# Patient Record
Sex: FEMALE | Race: WHITE | NOT HISPANIC OR LATINO | Employment: FULL TIME | URBAN - METROPOLITAN AREA
[De-identification: names, ages, dates, MRNs, and addresses within clinical notes are randomized per-mention and may not be internally consistent; named-entity substitution may affect disease eponyms.]

---
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
## 2018-01-22 VITALS
HEIGHT: 66 IN | DIASTOLIC BLOOD PRESSURE: 82 MMHG | SYSTOLIC BLOOD PRESSURE: 128 MMHG | WEIGHT: 233 LBS | HEART RATE: 93 BPM | BODY MASS INDEX: 37.45 KG/M2

## 2018-01-29 ENCOUNTER — OFFICE VISIT (OUTPATIENT)
Dept: OBGYN CLINIC | Facility: CLINIC | Age: 18
End: 2018-01-29
Payer: COMMERCIAL

## 2018-01-29 VITALS
SYSTOLIC BLOOD PRESSURE: 105 MMHG | DIASTOLIC BLOOD PRESSURE: 72 MMHG | HEIGHT: 66 IN | HEART RATE: 81 BPM | WEIGHT: 224 LBS | BODY MASS INDEX: 36 KG/M2

## 2018-01-29 DIAGNOSIS — Z30.41 ENCOUNTER FOR SURVEILLANCE OF CONTRACEPTIVE PILLS: ICD-10-CM

## 2018-01-29 DIAGNOSIS — Z01.419 ENCOUNTER FOR GYNECOLOGICAL EXAMINATION WITHOUT ABNORMAL FINDING: Primary | ICD-10-CM

## 2018-01-29 PROCEDURE — 99395 PREV VISIT EST AGE 18-39: CPT | Performed by: NURSE PRACTITIONER

## 2018-01-29 RX ORDER — NORGESTIMATE AND ETHINYL ESTRADIOL 0.25-0.035
1 KIT ORAL DAILY
Qty: 28 TABLET | Refills: 11 | Status: SHIPPED | OUTPATIENT
Start: 2018-01-29 | End: 2020-01-18 | Stop reason: ALTCHOICE

## 2018-01-29 NOTE — PROGRESS NOTES
Ancelmo Dietz is a 21 y o   [de-identified] female who presents with uterine fibroids  Periods are regular every 28-30 days, lasting 5 days  Dysmenorrhea:none  Cyclic symptoms include breast tenderness  No intermenstrual bleeding, spotting, or discharge  Patient is unsure if she received the Gardasil vaccine series  The patient last seen here in October of 2017 and cultures for Chlamydia and gonorrhea were both negative at that time, she is with the same partner  Patient is on Sprintec,  1 pill daily denies any problems with use  Denies any ACHES sx  Current contraception: OCP (estrogen/progesterone)  History of abnormal Pap smear:not applicable  Family history of uterine or ovarian cancer: no  Regular self breast exam: no  History of abnormal mammogram: not applicable  Family history of breast cancer: no  History of abnormal lipids: no  Menstrual History:  OB History     No data available         Menarche age: 13  Patient's last menstrual period was 01/24/2018 (exact date)  The following portions of the patient's history were reviewed and updated as appropriate:    Review of Systems  Pertinent items are noted in HPI  Objective      /72 (BP Location: Left arm, Patient Position: Sitting, Cuff Size: Adult)   Pulse 81   Ht 5' 6" (1 676 m)   Wt 102 kg (224 lb)   LMP 01/24/2018 (Exact Date)   Breastfeeding? No   BMI 36 15 kg/m²     General:   alert and oriented, in no acute distress, alert, appears stated age and cooperative   Heart: regular rate and rhythm, S1, S2 normal, no murmur, click, rub or gallop   Lungs: clear to auscultation bilaterally   Abdomen: soft, non-tender, without masses or organomegaly   Vulva: Not examined   Vagina: Not examined   Cervix: Not examined   Uterus: Not examined   Adnexa: Not examined  Breasts- no masses noted, no skin changes               Assessment   Annual GYN exam  Contraception management     Plan   Sprintec renewed  Information on Gardisil given, call if not received in the past   RTO in 1 year

## 2019-09-11 ENCOUNTER — OFFICE VISIT (OUTPATIENT)
Dept: URGENT CARE | Facility: CLINIC | Age: 19
End: 2019-09-11
Payer: COMMERCIAL

## 2019-09-11 VITALS
BODY MASS INDEX: 38.09 KG/M2 | OXYGEN SATURATION: 97 % | WEIGHT: 237 LBS | HEART RATE: 91 BPM | HEIGHT: 66 IN | RESPIRATION RATE: 16 BRPM | DIASTOLIC BLOOD PRESSURE: 62 MMHG | SYSTOLIC BLOOD PRESSURE: 112 MMHG | TEMPERATURE: 97.7 F

## 2019-09-11 DIAGNOSIS — S61.215A LACERATION OF LEFT RING FINGER WITHOUT FOREIGN BODY WITHOUT DAMAGE TO NAIL, INITIAL ENCOUNTER: Primary | ICD-10-CM

## 2019-09-11 PROCEDURE — 99213 OFFICE O/P EST LOW 20 MIN: CPT | Performed by: PHYSICIAN ASSISTANT

## 2019-09-11 PROCEDURE — 12001 RPR S/N/AX/GEN/TRNK 2.5CM/<: CPT | Performed by: PHYSICIAN ASSISTANT

## 2019-09-11 NOTE — PROGRESS NOTES
[facility]        NAME: Leydi Handler is a 19 y o  female  : 2000    MRN: [id number]  DATE: 2019  TIME: 3:56 PM    Assessment and Plan   Laceration of left ring finger without foreign body without damage to nail, initial encounter [S61 215A]  1  Laceration of left ring finger without foreign body without damage to nail, initial encounter           Patient Instructions     Patient Instructions     This was closed with Dermabond  Hemostasis achieved  Keep dry today  She had a tetanus shot 2 years ago  Monitor for signs of infection  Follow up with PCP in 3-5 days  Proceed to  ER if symptoms worsen  Chief Complaint     Chief Complaint   Patient presents with    Laceration     left hand/ring finger, cutting cheese and cut the tip of finger         History of Present Illness         68-year-old female presents with left ring finger wound today  She was cutting cheese and slice the tip of her finger  She is right-hand dominant  Last tetanus shot was 2 years ago  Review of Systems   Review of Systems   Constitutional: Negative  Respiratory: Negative  Cardiovascular: Negative  Skin: Positive for wound  Neurological: Negative for numbness  Current Medications       Current Outpatient Medications:     norgestimate-ethinyl estradiol (SPRINTEC 28) 0 25-35 MG-MCG per tablet, Take 1 tablet by mouth daily, Disp: 28 tablet, Rfl: 11    Current Allergies     Allergies as of 2019    (No Known Allergies)            The following portions of the patient's history were reviewed and updated as appropriate: allergies, current medications, past family history, past medical history, past social history, past surgical history and problem list      History reviewed  No pertinent past medical history      Past Surgical History:   Procedure Laterality Date    FRACTURE SURGERY Right     Debbie Hussein and had surgery with plates and 6 screws       Family History Problem Relation Age of Onset    Lymphoma Brother          Medications have been verified  Objective   /62 (BP Location: Right arm, Patient Position: Sitting, Cuff Size: Large)   Pulse 91   Temp 97 7 °F (36 5 °C) (Temporal)   Resp 16   Ht 5' 6" (1 676 m)   Wt 108 kg (237 lb)   SpO2 97%   BMI 38 25 kg/m²          Physical Exam     Physical Exam   Constitutional: She appears well-developed and well-nourished  No distress  Cardiovascular: Normal rate and regular rhythm  Pulmonary/Chest: Effort normal and breath sounds normal    Skin:     Left ring finger tip with a 3 mm x 6 mm skin avulsion  No flap or wound to with suture  Wound was irrigated and cleaned  This was closed with Dermabond  Patient tolerated well  Hemostasis achieved  Laceration repair  Date/Time: 9/11/2019 3:58 PM  Performed by: Vince Land PA-C  Authorized by: Vince Land PA-C   Consent: Verbal consent obtained    Consent given by: patient  Patient identity confirmed: verbally with patient  Body area: upper extremity  Location details: right ring finger  Laceration length: 6 cm  Foreign bodies: no foreign bodies  Tendon involvement: none  Nerve involvement: none  Vascular damage: no    Sedation:  Patient sedated: no        Procedure Details:  Skin closure: glue  Approximation difficulty: simple

## 2019-09-11 NOTE — PATIENT INSTRUCTIONS
This was closed with Dermabond  Hemostasis achieved  Keep dry today  She had a tetanus shot 2 years ago  Monitor for signs of infection

## 2020-01-18 ENCOUNTER — HOSPITAL ENCOUNTER (EMERGENCY)
Facility: HOSPITAL | Age: 20
Discharge: HOME | End: 2020-01-18
Attending: EMERGENCY MEDICINE | Admitting: EMERGENCY MEDICINE

## 2020-01-18 VITALS
RESPIRATION RATE: 18 BRPM | HEART RATE: 92 BPM | BODY MASS INDEX: 36.95 KG/M2 | DIASTOLIC BLOOD PRESSURE: 55 MMHG | HEIGHT: 66 IN | SYSTOLIC BLOOD PRESSURE: 124 MMHG | WEIGHT: 229.94 LBS | OXYGEN SATURATION: 100 % | TEMPERATURE: 98 F

## 2020-01-18 DIAGNOSIS — Z34.90 PREGNANCY, UNSPECIFIED GESTATIONAL AGE: Primary | ICD-10-CM

## 2020-01-18 LAB
EXT PREG TEST URINE: POSITIVE
EXT. CONTROL ED NAV: ABNORMAL

## 2020-01-18 PROCEDURE — 81025 URINE PREGNANCY TEST: CPT | Performed by: EMERGENCY MEDICINE

## 2020-01-18 PROCEDURE — 99282 EMERGENCY DEPT VISIT SF MDM: CPT | Performed by: EMERGENCY MEDICINE

## 2020-01-18 PROCEDURE — 99284 EMERGENCY DEPT VISIT MOD MDM: CPT

## 2020-01-18 NOTE — ED PROVIDER NOTES
History  Chief Complaint   Patient presents with    Possible Pregnancy     last period dec 15th, + nausea, denies abd pain or vaginal bleeding      Pt comes to ED requesting pregnancy test  Last period one month ago  No abd pain  No vaginal bleeding  No weakness, syncope or presyncope  No other sxs or concerns  None       History reviewed  No pertinent past medical history  Past Surgical History:   Procedure Laterality Date    FRACTURE SURGERY Right 2012    Alberto Corina and had surgery with plates and 6 screws       Family History   Problem Relation Age of Onset    Lymphoma Brother      I have reviewed and agree with the history as documented  Social History     Tobacco Use    Smoking status: Never Smoker    Smokeless tobacco: Never Used   Substance Use Topics    Alcohol use: No    Drug use: No        Review of Systems   Gastrointestinal: Negative for abdominal pain and vomiting  Neurological: Negative for syncope and weakness  All other systems reviewed and are negative  Physical Exam  Physical Exam   Constitutional: She is oriented to person, place, and time  She appears well-developed and well-nourished  No distress  HENT:   Head: Normocephalic and atraumatic  Eyes: Pupils are equal, round, and reactive to light  Conjunctivae and EOM are normal  Right eye exhibits no discharge  Left eye exhibits no discharge  Neck: Normal range of motion  Neck supple  No JVD present  Pulmonary/Chest: No stridor  Abdominal: Soft  She exhibits no distension  There is no tenderness  Musculoskeletal: Normal range of motion  She exhibits no edema, tenderness or deformity  Neurological: She is alert and oriented to person, place, and time  No cranial nerve deficit or sensory deficit  She exhibits normal muscle tone  Coordination normal    Skin: Skin is warm and dry  Capillary refill takes less than 2 seconds  She is not diaphoretic  Nursing note and vitals reviewed        Vital Signs  ED Triage Vitals [01/18/20 1033]   Temperature Pulse Respirations Blood Pressure SpO2   98 °F (36 7 °C) 92 18 124/55 100 %      Temp Source Heart Rate Source Patient Position - Orthostatic VS BP Location FiO2 (%)   Oral Monitor Sitting Left arm --      Pain Score       No Pain           Vitals:    01/18/20 1033   BP: 124/55   Pulse: 92   Patient Position - Orthostatic VS: Sitting         Visual Acuity      ED Medications  Medications - No data to display    Diagnostic Studies  Results Reviewed     Procedure Component Value Units Date/Time    POCT pregnancy, urine [[id number]]  (Abnormal) Resulted:  01/18/20 1116    Lab Status:  Final result Updated:  01/18/20 1116     EXT PREG TEST UR (Ref: Negative) positive     Control   No orders to display              Procedures  Procedures         ED Course                               MDM  Number of Diagnoses or Management Options  Pregnancy, unspecified gestational age:   Diagnosis management comments: Early pregnancy without s/s concerning for acute pathology eg ectopic  Plan - d/c home, f/u ob, rted if abd pain weakness syncope etc          Disposition  Final diagnoses:   Pregnancy, unspecified gestational age     Time reflects when diagnosis was documented in both MDM as applicable and the Disposition within this note     Time User Action Codes Description Comment    1/18/2020 11:28 AM Amy Raya Add [Z34 90] Pregnancy, unspecified gestational age       ED Disposition     ED Disposition Condition Date/Time Comment    Discharge Stable Sat Jan 18, 2020 11:28 AM Eleanor Gant discharge to home/self care              Follow-up Information     Follow up With Specialties Details Why Contact Info Additional [address]  Obstetrics and Gynecology   [address] [facility] [address]  [address]  [facility] Gynecology [address] [facility] [address], [address] [facility] [address], [address] [address], [place], [address] [facility] [address]    An Rashid MD Obstetrics and Gynecology, Obstetrics, Gynecology   [phone] [address] [address]  [phone]             There are no discharge medications for this patient  No discharge procedures on file      ED Provider  Electronically Signed by           Hipolito Mccormack MD  01/18/20 2367

## 2020-01-25 ENCOUNTER — APPOINTMENT (EMERGENCY)
Dept: ULTRASOUND IMAGING | Facility: HOSPITAL | Age: 20
End: 2020-01-25

## 2020-01-25 ENCOUNTER — HOSPITAL ENCOUNTER (EMERGENCY)
Facility: HOSPITAL | Age: 20
Discharge: HOME | End: 2020-01-25
Attending: EMERGENCY MEDICINE

## 2020-01-25 VITALS
WEIGHT: 229.72 LBS | SYSTOLIC BLOOD PRESSURE: 126 MMHG | OXYGEN SATURATION: 98 % | DIASTOLIC BLOOD PRESSURE: 57 MMHG | TEMPERATURE: 98.5 F | RESPIRATION RATE: 20 BRPM | HEIGHT: 66 IN | BODY MASS INDEX: 36.92 KG/M2 | HEART RATE: 96 BPM

## 2020-01-25 DIAGNOSIS — R10.9 ABDOMINAL CRAMPING: ICD-10-CM
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
## 2020-01-25 LAB
ABO GROUP BLD: NORMAL
B-HCG SERPL-ACNC: 7468 MIU/ML
EXT PREG TEST URINE: POSITIVE
EXT. CONTROL ED NAV: ABNORMAL
RH BLD: POSITIVE

## 2020-01-25 PROCEDURE — 99284 EMERGENCY DEPT VISIT MOD MDM: CPT

## 2020-01-25 PROCEDURE — 36415 COLL VENOUS BLD VENIPUNCTURE: CPT | Performed by: PHYSICIAN ASSISTANT

## 2020-01-25 PROCEDURE — 86901 BLOOD TYPING SEROLOGIC RH(D): CPT | Performed by: PHYSICIAN ASSISTANT

## 2020-01-25 PROCEDURE — 84702 CHORIONIC GONADOTROPIN TEST: CPT | Performed by: PHYSICIAN ASSISTANT

## 2020-01-25 PROCEDURE — 81025 URINE PREGNANCY TEST: CPT | Performed by: PHYSICIAN ASSISTANT

## 2020-01-25 PROCEDURE — 76815 OB US LIMITED FETUS(S): CPT

## 2020-01-25 PROCEDURE — 99284 EMERGENCY DEPT VISIT MOD MDM: CPT | Performed by: PHYSICIAN ASSISTANT

## 2020-01-25 PROCEDURE — 86900 BLOOD TYPING SEROLOGIC ABO: CPT | Performed by: PHYSICIAN ASSISTANT
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
## 2020-01-28 ENCOUNTER — APPOINTMENT (OUTPATIENT)
Dept: LAB | Facility: CLINIC | Age: 20
End: 2020-01-28

## 2020-01-28 DIAGNOSIS — R10.9 ABDOMINAL CRAMPING: ICD-10-CM

## 2020-01-28 LAB — B-HCG SERPL-ACNC: ABNORMAL MIU/ML

## 2020-01-28 PROCEDURE — 36415 COLL VENOUS BLD VENIPUNCTURE: CPT

## 2020-01-28 PROCEDURE — 84702 CHORIONIC GONADOTROPIN TEST: CPT

## 2020-01-29 ENCOUNTER — OFFICE VISIT (OUTPATIENT)
Dept: URGENT CARE | Facility: CLINIC | Age: 20
End: 2020-01-29

## 2020-01-29 VITALS
RESPIRATION RATE: 18 BRPM | HEART RATE: 90 BPM | DIASTOLIC BLOOD PRESSURE: 82 MMHG | SYSTOLIC BLOOD PRESSURE: 108 MMHG | TEMPERATURE: 97.5 F | HEIGHT: 66 IN | BODY MASS INDEX: 36.48 KG/M2 | OXYGEN SATURATION: 97 % | WEIGHT: 227 LBS

## 2020-01-29 DIAGNOSIS — R05.9 COUGH: Primary | ICD-10-CM

## 2020-01-29 PROCEDURE — G0382 LEV 3 HOSP TYPE B ED VISIT: HCPCS | Performed by: PHYSICIAN ASSISTANT

## 2020-01-29 RX ORDER — AMOXICILLIN 500 MG/1
500 TABLET, FILM COATED ORAL 3 TIMES DAILY
Qty: 21 TABLET | Refills: 0 | Status: SHIPPED | OUTPATIENT
Start: 2020-01-29 | End: 2020-02-05

## 2020-01-29 NOTE — PROGRESS NOTES
[facility]        NAME: Marvin Dahl is a 22 y o  female  : 2000    MRN: [id number]  DATE: 2020  TIME: 11:25 AM    Assessment and Plan   Cough [R05]  1  Cough  amoxicillin (AMOXIL) 500 MG tablet         Patient Instructions     Patient Instructions   Due to 10 days of symptoms and pregnancy will treat with amoxicillin  She can use over-the-counter cold meds  I gave her printed out for pregnancy  Follow up with PCP in 3-5 days  Proceed to  ER if symptoms worsen  Chief Complaint     Chief Complaint   Patient presents with    Cough     Pt has had cough that has been going on for 1 5-2 weeks         History of Present Illness       22year old female present the clinic with 10 days of cough and some congestion  No fever  She has pregnant  No chest pain shortness of breath  She was taking over the counter cold medicines  Review of Systems   Review of Systems   Constitutional: Negative for chills, fatigue and fever  HENT: Positive for congestion, sinus pressure and sore throat  Eyes: Negative  Negative for visual disturbance  Respiratory: Positive for cough  Negative for chest tightness and shortness of breath  Cardiovascular: Negative for chest pain and palpitations  Gastrointestinal: Negative  Negative for diarrhea, nausea and vomiting  Neurological: Negative for dizziness  Current Medications       Current Outpatient Medications:     amoxicillin (AMOXIL) 500 MG tablet, Take 1 tablet (500 mg total) by mouth 3 (three) times a day for 7 days, Disp: 21 tablet, Rfl: 0    Current Allergies     Allergies as of 2020    (No Known Allergies)            The following portions of the patient's history were reviewed and updated as appropriate: allergies, current medications, past family history, past medical history, past social history, past surgical history and problem list      History reviewed  No pertinent past medical history      Past Surgical History:   Procedure Laterality Date    FRACTURE SURGERY Right 2012    Tirso Santoyo and had surgery with plates and 6 screws       Family History   Problem Relation Age of Onset    Lymphoma Brother          Medications have been verified  Objective   /82   Pulse 90   Temp 97 5 °F (36 4 °C) (Temporal)   Resp 18   Ht 5' 6" (1 676 m)   Wt 103 kg (227 lb)   LMP 12/18/2019   SpO2 97%   BMI 36 64 kg/m²        Physical Exam     Physical Exam   Constitutional: She is oriented to person, place, and time  She appears well-developed and well-nourished  No distress  HENT:   Head: Normocephalic and atraumatic  Right Ear: Tympanic membrane, external ear and ear canal normal  Tympanic membrane is not erythematous, not retracted and not bulging  No middle ear effusion  Left Ear: Tympanic membrane, external ear and ear canal normal  Tympanic membrane is not erythematous, not retracted and not bulging  No middle ear effusion  Nose: Nose normal  No mucosal edema or rhinorrhea  Right sinus exhibits no maxillary sinus tenderness and no frontal sinus tenderness  Left sinus exhibits no maxillary sinus tenderness and no frontal sinus tenderness  Mouth/Throat: Oropharynx is clear and moist and mucous membranes are normal  No posterior oropharyngeal erythema  Eyes: Pupils are equal, round, and reactive to light  Conjunctivae and EOM are normal  Right eye exhibits no chemosis and no discharge  Left eye exhibits no chemosis and no discharge  Right conjunctiva is not injected  Left conjunctiva is not injected  Neck: Normal range of motion  Neck supple  Cardiovascular: Normal rate, regular rhythm and normal heart sounds  Pulmonary/Chest: Effort normal and breath sounds normal  No respiratory distress  She has no wheezes  She has no rales  Lymphadenopathy:     She has no cervical adenopathy  Right cervical: No superficial cervical adenopathy present         Left cervical: No superficial cervical adenopathy present  Neurological: She is alert and oriented to person, place, and time  No cranial nerve deficit  Skin: Skin is warm and dry  No rash noted

## 2020-01-29 NOTE — PATIENT INSTRUCTIONS
Due to 10 days of symptoms and pregnancy will treat with amoxicillin  She can use over-the-counter cold meds  I gave her printed out for pregnancy

## 2020-02-13 ENCOUNTER — ULTRASOUND (OUTPATIENT)
Dept: OBGYN CLINIC | Facility: CLINIC | Age: 20
End: 2020-02-13

## 2020-02-13 VITALS
HEIGHT: 66 IN | HEART RATE: 86 BPM | SYSTOLIC BLOOD PRESSURE: 103 MMHG | BODY MASS INDEX: 36.8 KG/M2 | DIASTOLIC BLOOD PRESSURE: 66 MMHG | WEIGHT: 229 LBS

## 2020-02-13 DIAGNOSIS — O21.9 NAUSEA AND VOMITING IN PREGNANCY: Primary | ICD-10-CM

## 2020-02-13 DIAGNOSIS — Z3A.08 8 WEEKS GESTATION OF PREGNANCY: ICD-10-CM

## 2020-02-13 PROCEDURE — 99213 OFFICE O/P EST LOW 20 MIN: CPT | Performed by: OBSTETRICS & GYNECOLOGY

## 2020-02-13 PROCEDURE — 76801 OB US < 14 WKS SINGLE FETUS: CPT | Performed by: OBSTETRICS & GYNECOLOGY

## 2020-02-13 RX ORDER — ONDANSETRON 4 MG/1
4 TABLET, ORALLY DISINTEGRATING ORAL EVERY 6 HOURS PRN
Qty: 30 TABLET | Refills: 2 | Status: SHIPPED | OUTPATIENT
Start: 2020-02-13 | End: 2020-03-17 | Stop reason: ALTCHOICE

## 2020-02-13 NOTE — PROGRESS NOTES
Nicki Roberts presents today for routine OB visit at [address]  /66 (BP Location: Left arm, Patient Position: Sitting, Cuff Size: Adult)   Pulse 86   Ht 5' 6" (1 676 m)   Wt 104 kg (229 lb)   LMP 12/18/2019   BMI 36 96 kg/m²   She reports nausea and vomiting  Denies vaginal bleeding or leaking of fluid     Scheduled for next ultrasound to be scheduled  Reviewed common discomforts of pregnancy in first trimester and warning signs  Advised to continue prenatal vitamins and return for OB intake  Pregnancy Problems (from 02/13/20 to present)     No problems associated with this episode

## 2020-02-17 ENCOUNTER — INITIAL PRENATAL (OUTPATIENT)
Dept: OBGYN CLINIC | Facility: CLINIC | Age: 20
End: 2020-02-17

## 2020-02-17 VITALS
DIASTOLIC BLOOD PRESSURE: 72 MMHG | BODY MASS INDEX: 36.8 KG/M2 | HEART RATE: 82 BPM | SYSTOLIC BLOOD PRESSURE: 112 MMHG | HEIGHT: 66 IN | WEIGHT: 229 LBS | RESPIRATION RATE: 16 BRPM

## 2020-02-17 DIAGNOSIS — Z3A.08 8 WEEKS GESTATION OF PREGNANCY: Primary | ICD-10-CM

## 2020-02-17 PROCEDURE — 99211 OFF/OP EST MAY X REQ PHY/QHP: CPT

## 2020-02-17 NOTE — LETTER
[facility] Letter    Danny Douglass  2000  [address]  [place] [address]       02/17/20          Danny Douglass is a patient and under our care in our office  Jas Garcia's Estimated Date of Delivery: 9/23/20  Any questions or concerns feel free to contact our office       Thank you,    [address] [facility] [address]  [id number] [place] [address]/Kade Ponce 15  [address] [facility]/Ming Mccord [address] [place] [address]/[address]  [phone]

## 2020-02-17 NOTE — LETTER
Work Letter    Ryan Young  2000  [place] [id number]    Dear Ryan Young,      02/17/20        Your employee is a patient at [facility]  We recommend that all pregnant women:    1  Have a well-ventilated workspace  2  Wear low-heeled shoes  3  Work no more than 40 hours per week  4  Have a 15 minute break every 2 hours and at least 30 minutes for a meal break  5  Use good body mechanics by bending at your knees to avoid back strain and lift no more than 20 pounds without assistance  Will need assistance with lifting over 20 lbs  6  Have ready access to bathrooms and water  She may continue to work until her due date unless medical complications arise  We anticipate she may return to work in 6-8 weeks after delivery       Sincerely,    [facility]
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
## 2020-03-05 ENCOUNTER — ROUTINE PRENATAL (OUTPATIENT)
Dept: OBGYN CLINIC | Facility: CLINIC | Age: 20
End: 2020-03-05

## 2020-03-05 VITALS
HEART RATE: 94 BPM | BODY MASS INDEX: 36.48 KG/M2 | SYSTOLIC BLOOD PRESSURE: 107 MMHG | WEIGHT: 226 LBS | DIASTOLIC BLOOD PRESSURE: 74 MMHG

## 2020-03-05 DIAGNOSIS — Z3A.11 11 WEEKS GESTATION OF PREGNANCY: Primary | ICD-10-CM

## 2020-03-05 DIAGNOSIS — Z11.3 ROUTINE SCREENING FOR STI (SEXUALLY TRANSMITTED INFECTION): ICD-10-CM

## 2020-03-05 PROCEDURE — 99213 OFFICE O/P EST LOW 20 MIN: CPT | Performed by: OBSTETRICS & GYNECOLOGY

## 2020-03-05 PROCEDURE — 87491 CHLMYD TRACH DNA AMP PROBE: CPT | Performed by: OBSTETRICS & GYNECOLOGY

## 2020-03-05 PROCEDURE — 87591 N.GONORRHOEAE DNA AMP PROB: CPT | Performed by: OBSTETRICS & GYNECOLOGY

## 2020-03-05 PROCEDURE — G0145 SCR C/V CYTO,THINLAYER,RESCR: HCPCS | Performed by: OBSTETRICS & GYNECOLOGY

## 2020-03-05 NOTE — PROGRESS NOTES
Assessment     22year old with a Pregnancy at 6 and 1/7 weeks      Plan    Patient still needs to go for prenatal panel, encouraged to do this, she states she will this week  GC/chlamydia collected today  Pap collected today  First trimester appointment at  center scheduled for 3/17/2020  Prenatal vitamins  Problem list reviewed and updated  Follow up in 4 weeks  Ancelmo Brooke is being seen today for her first obstetrical visit  This is not a planned pregnancy  She is at 11w1d gestation  Her obstetrical history is significant for obesity  Relationship with FOB: significant other, not living together  Patient does intend to breast feed  Pregnancy history fully reviewed  Menstrual History:  OB History        1    Para   0    Term   0       0    AB   0    Living   0       SAB   0    TAB   0    Ectopic   0    Multiple   0    Live Births   0                  Patient's last menstrual period was 2019  The following portions of the patient's history were reviewed and updated as appropriate: allergies, current medications, past family history, past medical history, past social history, past surgical history and problem list     Review of Systems  Pertinent items are noted in HPI        Objective     Wt 103 kg (226 lb)   LMP 2019   BMI 36 48 kg/m²   General appearance: alert and oriented, in no acute distress  Lungs: clear to auscultation bilaterally  Breasts: normal appearance, no masses or tenderness  Heart: regular rate and rhythm, S1, S2 normal, no murmur, click, rub or gallop  Abdomen: soft, non-tender; bowel sounds normal; no masses,  no organomegaly  Pelvic: external genitalia normal, vagina normal without discharge, uterus normal size, shape, and consistency, no cervical motion tenderness, cervix normal in appearance and no adnexal masses or tenderness      FHT: 160bpm by US

## 2020-03-06 LAB
C TRACH DNA SPEC QL NAA+PROBE: NEGATIVE
N GONORRHOEA DNA SPEC QL NAA+PROBE: NEGATIVE

## 2020-03-10 ENCOUNTER — APPOINTMENT (OUTPATIENT)
Dept: LAB | Facility: HOSPITAL | Age: 20
End: 2020-03-10

## 2020-03-10 DIAGNOSIS — Z3A.08 8 WEEKS GESTATION OF PREGNANCY: ICD-10-CM

## 2020-03-10 LAB
ABO GROUP BLD: NORMAL
BASOPHILS # BLD AUTO: 0.03 THOUSANDS/ΜL (ref 0–0.1)
BASOPHILS NFR BLD AUTO: 0 % (ref 0–1)
BILIRUB UR QL STRIP: NEGATIVE
BLD GP AB SCN SERPL QL: NEGATIVE
CLARITY UR: NORMAL
COLOR UR: YELLOW
EOSINOPHIL # BLD AUTO: 0.11 THOUSAND/ΜL (ref 0–0.61)
EOSINOPHIL NFR BLD AUTO: 2 % (ref 0–6)
ERYTHROCYTE [DISTWIDTH] IN BLOOD BY AUTOMATED COUNT: 13.2 % (ref 11.6–15.1)
GLUCOSE UR STRIP-MCNC: NEGATIVE MG/DL
HCT VFR BLD AUTO: 38.1 % (ref 34.8–46.1)
HGB BLD-MCNC: 12.5 G/DL (ref 11.5–15.4)
HGB UR QL STRIP.AUTO: NEGATIVE
IMM GRANULOCYTES # BLD AUTO: 0.02 THOUSAND/UL (ref 0–0.2)
IMM GRANULOCYTES NFR BLD AUTO: 0 % (ref 0–2)
KETONES UR STRIP-MCNC: NEGATIVE MG/DL
LAB AP GYN PRIMARY INTERPRETATION: NORMAL
LEUKOCYTE ESTERASE UR QL STRIP: NEGATIVE
LYMPHOCYTES # BLD AUTO: 1.85 THOUSANDS/ΜL (ref 0.6–4.47)
LYMPHOCYTES NFR BLD AUTO: 28 % (ref 14–44)
Lab: NORMAL
MCH RBC QN AUTO: 28.3 PG (ref 26.8–34.3)
MCHC RBC AUTO-ENTMCNC: 32.8 G/DL (ref 31.4–37.4)
MCV RBC AUTO: 86 FL (ref 82–98)
MONOCYTES # BLD AUTO: 0.5 THOUSAND/ΜL (ref 0.17–1.22)
MONOCYTES NFR BLD AUTO: 8 % (ref 4–12)
NEUTROPHILS # BLD AUTO: 4.19 THOUSANDS/ΜL (ref 1.85–7.62)
NEUTS SEG NFR BLD AUTO: 62 % (ref 43–75)
NITRITE UR QL STRIP: NEGATIVE
NRBC BLD AUTO-RTO: 0 /100 WBCS
PH UR STRIP.AUTO: 7 [PH]
PLATELET # BLD AUTO: 359 THOUSANDS/UL (ref 149–390)
PMV BLD AUTO: 10.1 FL (ref 8.9–12.7)
PROT UR STRIP-MCNC: NEGATIVE MG/DL
RBC # BLD AUTO: 4.42 MILLION/UL (ref 3.81–5.12)
RH BLD: POSITIVE
RUBV IGG SERPL IA-ACNC: 64.5 IU/ML
SP GR UR STRIP.AUTO: 1.01 (ref 1–1.03)
SPECIMEN EXPIRATION DATE: NORMAL
UROBILINOGEN UR QL STRIP.AUTO: 0.2 E.U./DL
WBC # BLD AUTO: 6.7 THOUSAND/UL (ref 4.31–10.16)

## 2020-03-10 PROCEDURE — 81003 URINALYSIS AUTO W/O SCOPE: CPT

## 2020-03-10 PROCEDURE — 36415 COLL VENOUS BLD VENIPUNCTURE: CPT

## 2020-03-10 PROCEDURE — 87086 URINE CULTURE/COLONY COUNT: CPT

## 2020-03-10 PROCEDURE — 80081 OBSTETRIC PANEL INC HIV TSTG: CPT

## 2020-03-11 LAB
BACTERIA UR CULT: NORMAL
HBV SURFACE AG SER QL: NORMAL
HIV 1+2 AB+HIV1 P24 AG SERPL QL IA: NORMAL
RPR SER QL: NORMAL

## 2020-03-12 ENCOUNTER — TELEPHONE (OUTPATIENT)
Dept: OBGYN CLINIC | Facility: CLINIC | Age: 20
End: 2020-03-12

## 2020-03-16 ENCOUNTER — TELEPHONE (OUTPATIENT)
Dept: PERINATAL CARE | Facility: CLINIC | Age: 20
End: 2020-03-16

## 2020-03-17 ENCOUNTER — ROUTINE PRENATAL (OUTPATIENT)
Dept: PERINATAL CARE | Facility: OTHER | Age: 20
End: 2020-03-17

## 2020-03-17 VITALS
HEART RATE: 94 BPM | SYSTOLIC BLOOD PRESSURE: 102 MMHG | HEIGHT: 66 IN | BODY MASS INDEX: 36.93 KG/M2 | DIASTOLIC BLOOD PRESSURE: 71 MMHG | WEIGHT: 229.8 LBS

## 2020-03-17 DIAGNOSIS — Z36.82 NUCHAL TRANSLUCENCY OF FETUS ON PRENATAL ULTRASOUND: ICD-10-CM

## 2020-03-17 DIAGNOSIS — Z3A.12 12 WEEKS GESTATION OF PREGNANCY: ICD-10-CM

## 2020-03-17 DIAGNOSIS — O99.210 OBESITY IN PREGNANCY, ANTEPARTUM: Primary | ICD-10-CM

## 2020-03-17 PROCEDURE — 76801 OB US < 14 WKS SINGLE FETUS: CPT | Performed by: OBSTETRICS & GYNECOLOGY

## 2020-03-17 PROCEDURE — 99241 PR OFFICE CONSULTATION NEW/ESTAB PATIENT 15 MIN: CPT | Performed by: OBSTETRICS & GYNECOLOGY

## 2020-03-17 NOTE — LETTER
March 17, 2020     Adilene Albertina, [address] [id number]    Patient: Danny Douglass   YOB: 2000   Date of Visit: 3/17/2020       Dear Dr Flaco Feliz: Thank you for referring Danny Douglass to me for evaluation  Below are my notes for this consultation  If you have questions, please do not hesitate to call me  I look forward to following your patient along with you  Sincerely,        Hermelinda Costa MD        CC: No Recipients  Hermelinda Costa MD  3/17/2020  7:49 PM  Sign at close encounter  Please see her ultrasound report in a separate report    Hermelinda Costa MD

## 2020-03-22 DIAGNOSIS — O99.210 OBESITY IN PREGNANCY, ANTEPARTUM: Primary | ICD-10-CM

## 2020-03-22 DIAGNOSIS — Z3A.12 12 WEEKS GESTATION OF PREGNANCY: ICD-10-CM

## 2020-04-01 PROBLEM — Z3A.15 15 WEEKS GESTATION OF PREGNANCY: Status: ACTIVE | Noted: 2020-03-05

## 2020-04-02 ENCOUNTER — ROUTINE PRENATAL (OUTPATIENT)
Dept: OBGYN CLINIC | Facility: CLINIC | Age: 20
End: 2020-04-02

## 2020-04-02 VITALS
WEIGHT: 230 LBS | SYSTOLIC BLOOD PRESSURE: 116 MMHG | BODY MASS INDEX: 37.12 KG/M2 | DIASTOLIC BLOOD PRESSURE: 71 MMHG | HEART RATE: 83 BPM

## 2020-04-02 DIAGNOSIS — R11.2 NAUSEA AND VOMITING, INTRACTABILITY OF VOMITING NOT SPECIFIED, UNSPECIFIED VOMITING TYPE: ICD-10-CM

## 2020-04-02 DIAGNOSIS — O99.210 OBESITY IN PREGNANCY, ANTEPARTUM: ICD-10-CM

## 2020-04-02 DIAGNOSIS — Z3A.15 15 WEEKS GESTATION OF PREGNANCY: Primary | ICD-10-CM

## 2020-04-02 PROCEDURE — 99213 OFFICE O/P EST LOW 20 MIN: CPT | Performed by: OBSTETRICS & GYNECOLOGY

## 2020-04-02 RX ORDER — ASPIRIN 81 MG/1
162 TABLET, CHEWABLE ORAL DAILY
Qty: 90 TABLET | Refills: 3 | Status: SHIPPED | OUTPATIENT
Start: 2020-04-02 | End: 2021-03-02 | Stop reason: ALTCHOICE

## 2020-04-02 RX ORDER — PYRIDOXINE HCL (VITAMIN B6) 50 MG
25 TABLET ORAL 3 TIMES DAILY
Qty: 90 EACH | Refills: 3 | Status: SHIPPED | OUTPATIENT
Start: 2020-04-02 | End: 2020-09-03

## 2020-04-23 ENCOUNTER — TELEPHONE (OUTPATIENT)
Dept: PERINATAL CARE | Facility: OTHER | Age: 20
End: 2020-04-23

## 2020-04-29 PROBLEM — Z30.09 CONTRACEPTIVE EDUCATION: Status: ACTIVE | Noted: 2020-04-29

## 2020-04-29 PROBLEM — O21.9 NAUSEA AND VOMITING IN PREGNANCY: Status: ACTIVE | Noted: 2020-04-29

## 2020-04-29 PROBLEM — Z3A.19 19 WEEKS GESTATION OF PREGNANCY: Status: ACTIVE | Noted: 2020-03-05

## 2020-04-30 ENCOUNTER — TELEMEDICINE (OUTPATIENT)
Dept: OBGYN CLINIC | Facility: CLINIC | Age: 20
End: 2020-04-30

## 2020-04-30 DIAGNOSIS — O99.210 OBESITY IN PREGNANCY, ANTEPARTUM: ICD-10-CM

## 2020-04-30 DIAGNOSIS — O21.9 NAUSEA AND VOMITING IN PREGNANCY: ICD-10-CM

## 2020-04-30 DIAGNOSIS — Z30.09 CONTRACEPTIVE EDUCATION: ICD-10-CM

## 2020-04-30 DIAGNOSIS — Z3A.19 19 WEEKS GESTATION OF PREGNANCY: Primary | ICD-10-CM

## 2020-04-30 PROCEDURE — G2012 BRIEF CHECK IN BY MD/QHP: HCPCS | Performed by: OBSTETRICS & GYNECOLOGY

## 2020-04-30 RX ORDER — ADHESIVE BANDAGE 3/4"
BANDAGE TOPICAL ONCE
Qty: 1 EACH | Refills: 0 | Status: SHIPPED | OUTPATIENT
Start: 2020-04-30 | End: 2020-04-30

## 2020-05-21 ENCOUNTER — TELEPHONE (OUTPATIENT)
Dept: OBGYN CLINIC | Facility: CLINIC | Age: 20
End: 2020-05-21

## 2020-05-21 ENCOUNTER — TELEPHONE (OUTPATIENT)
Dept: PERINATAL CARE | Facility: CLINIC | Age: 20
End: 2020-05-21

## 2020-05-21 PROBLEM — Z13.79 GENETIC SCREENING: Status: ACTIVE | Noted: 2020-05-21

## 2020-05-21 PROBLEM — Z3A.22 22 WEEKS GESTATION OF PREGNANCY: Status: ACTIVE | Noted: 2020-03-05

## 2020-05-21 PROBLEM — Z36.9 ENCOUNTER FOR FETAL ULTRASOUND: Status: ACTIVE | Noted: 2020-05-21

## 2020-05-22 ENCOUNTER — ROUTINE PRENATAL (OUTPATIENT)
Dept: PERINATAL CARE | Facility: OTHER | Age: 20
End: 2020-05-22

## 2020-05-22 ENCOUNTER — ROUTINE PRENATAL (OUTPATIENT)
Dept: OBGYN CLINIC | Facility: CLINIC | Age: 20
End: 2020-05-22

## 2020-05-22 VITALS
HEIGHT: 66 IN | DIASTOLIC BLOOD PRESSURE: 74 MMHG | HEART RATE: 102 BPM | TEMPERATURE: 96.5 F | BODY MASS INDEX: 38.18 KG/M2 | WEIGHT: 237.6 LBS | SYSTOLIC BLOOD PRESSURE: 133 MMHG

## 2020-05-22 VITALS
WEIGHT: 236.8 LBS | HEART RATE: 97 BPM | SYSTOLIC BLOOD PRESSURE: 104 MMHG | BODY MASS INDEX: 38.22 KG/M2 | DIASTOLIC BLOOD PRESSURE: 70 MMHG

## 2020-05-22 DIAGNOSIS — O99.210 OBESITY IN PREGNANCY, ANTEPARTUM: ICD-10-CM

## 2020-05-22 DIAGNOSIS — Z3A.22 22 WEEKS GESTATION OF PREGNANCY: Primary | ICD-10-CM

## 2020-05-22 DIAGNOSIS — Z36.9 ENCOUNTER FOR FETAL ULTRASOUND: ICD-10-CM

## 2020-05-22 PROCEDURE — 99213 OFFICE O/P EST LOW 20 MIN: CPT | Performed by: OBSTETRICS & GYNECOLOGY

## 2020-05-22 PROCEDURE — 76816 OB US FOLLOW-UP PER FETUS: CPT | Performed by: OBSTETRICS & GYNECOLOGY

## 2020-06-19 ENCOUNTER — ROUTINE PRENATAL (OUTPATIENT)
Dept: OBGYN CLINIC | Facility: CLINIC | Age: 20
End: 2020-06-19

## 2020-06-19 VITALS
DIASTOLIC BLOOD PRESSURE: 65 MMHG | HEART RATE: 94 BPM | BODY MASS INDEX: 38.58 KG/M2 | WEIGHT: 239 LBS | SYSTOLIC BLOOD PRESSURE: 109 MMHG

## 2020-06-19 DIAGNOSIS — O99.210 OBESITY IN PREGNANCY, ANTEPARTUM: ICD-10-CM

## 2020-06-19 DIAGNOSIS — Z3A.26 26 WEEKS GESTATION OF PREGNANCY: Primary | ICD-10-CM

## 2020-06-19 PROCEDURE — 99213 OFFICE O/P EST LOW 20 MIN: CPT | Performed by: NURSE PRACTITIONER

## 2020-07-01 ENCOUNTER — TELEPHONE (OUTPATIENT)
Dept: OBGYN CLINIC | Facility: CLINIC | Age: 20
End: 2020-07-01

## 2020-07-16 NOTE — PROGRESS NOTES
Ana Winter presents today for routine OB visit at 30w1d  Blood Pressure: 116/67  Ul=976 kg (235 lb 3 2 oz); Body mass index is 37 96 kg/m² ; TWG-Has gained 6 lb total weight gain or pregnancy,  She has lost 4 lb since her last visit she reports she has been very active but continues to eat healthy  =Fetal Heart Rate: 150; Fundal Height (cm): 31 cm  Urine dip: trace protein  Abdomen: gravid, soft, non-tender  She reports  She will get her 28 week labs done this coming week,  Awaiting on insurance  Denies uterine contractions  Denies vaginal bleeding or leaking of fluid  Reports adequate fetal movement of at least 10 movements in 2 hours once daily  Third trimester bloodwork  She is to complete her 28 week labs,  A + blood type   she did not complete quad screen  she is still taking vitamin B6 for nausea in the morning  Vaccinations:  Has declined Tdap- awaiting on insurance  obesity- 11-20 lb total weight gain in pregnancy recommended,  Last appointment total weight gain was at 10 lb, now 6 lbs   her last ultrasound was 05/22/2020,  Is to get a follow up ultrasound for growth and missed anatomy  Scheduled for next ultrasound 7/31/2020  Reviewed premature labor precautions and fetal kick counts  Advised to continue medications and return in 2 weeks  denies any COVID symptoms or exposures,  Review social distancing, hand washing, and masking         Current Outpatient Medications:     aspirin 81 mg chewable tablet, Chew 2 tablets (162 mg total) daily, Disp: 90 tablet, Rfl: 3    Prenatal Vit-Iron Carbonyl-FA (PRENATAL MULTIVITAMIN) TABS, Take 1 tablet by mouth daily, Disp: , Rfl:     Pyridoxine HCl (VITAMIN B-6) 50 MG TABS, Take 0 5 tablets (25 mg total) by mouth 3 (three) times a day, Disp: 90 each, Rfl: 3    vitamin B-12 (CYANOCOBALAMIN) 50 MCG tablet, Take 50 mcg by mouth daily, Disp: , Rfl:     doxylamine (UNISON) 25 MG tablet, Take 0 5 tablets (12 5 mg total) by mouth daily at bedtime as needed for sleep (Patient not taking: Reported on 4/30/2020), Disp: 30 tablet, Rfl: 3      Pregnancy Problems (from 02/13/20 to present)     Problem Noted Resolved    Fetal ultrasound 5/21/2020 by Efren Salcido MD No    Overview Signed 5/21/2020 10:07 PM by Efren Salcido MD     3/17/2020 (13w0d) - WNL           Genetic screening 5/21/2020 by Efren Salcido MD No    Overview Addendum 5/22/2020 10:05 AM by Efren Salcido MD      Quad screen ordered - Not done by patient - out of window at this point          Nausea and vomiting in pregnancy 4/29/2020 by Poly Blanco MD No    Overview Addendum 5/22/2020 10:06 AM by Efren Salcido MD     - Using B6 - Improved 5/22/2020         Contraceptive education 4/29/2020 by Poly Blanco MD No    Overview Addendum 5/22/2020 10:05 AM by Efren Salcido MD     - Address at follow up          Obesity in pregnancy, antepartum 3/17/2020 by Felix Hughes MD No    Overview Addendum 5/21/2020 10:09 PM by Efren Salcido MD     Encouraged healthy diet and exercise in pregnancy  Recommended weight gain 11-20lb  Early DM screen ordered -  Pending  Risk of PEC - LDASA initiated          26 weeks gestation of pregnancy 3/5/2020 by Tena Cheadle, MD No    Overview Addendum 5/21/2020 10:06 PM by Efren Salcido MD     Prenatal panel complete   Blood type: A positive, H&H 12 5/38 1  Pap smear NILM; GC/CT negative  Influenza vaccine offered, patient declines for now due to insurance coverage

## 2020-07-17 ENCOUNTER — ROUTINE PRENATAL (OUTPATIENT)
Dept: OBGYN CLINIC | Facility: CLINIC | Age: 20
End: 2020-07-17

## 2020-07-17 VITALS
SYSTOLIC BLOOD PRESSURE: 116 MMHG | TEMPERATURE: 97.8 F | DIASTOLIC BLOOD PRESSURE: 67 MMHG | HEART RATE: 101 BPM | BODY MASS INDEX: 37.8 KG/M2 | WEIGHT: 235.2 LBS | HEIGHT: 66 IN

## 2020-07-17 DIAGNOSIS — Z3A.30 30 WEEKS GESTATION OF PREGNANCY: ICD-10-CM

## 2020-07-17 DIAGNOSIS — O99.210 OBESITY IN PREGNANCY, ANTEPARTUM: Primary | ICD-10-CM

## 2020-07-17 LAB
SL AMB  POCT GLUCOSE, UA: NEGATIVE
SL AMB POCT KETONES,UA: NEGATIVE
SL AMB POCT URINE PROTEIN: ABNORMAL

## 2020-07-17 PROCEDURE — 81002 URINALYSIS NONAUTO W/O SCOPE: CPT | Performed by: NURSE PRACTITIONER

## 2020-07-17 PROCEDURE — 99213 OFFICE O/P EST LOW 20 MIN: CPT | Performed by: NURSE PRACTITIONER

## 2020-07-17 RX ORDER — MULTIVITAMIN WITH IRON
50 TABLET ORAL DAILY
COMMUNITY
End: 2020-09-03

## 2020-07-17 NOTE — PATIENT INSTRUCTIONS
Coronavirus (IEGHV-64) pregnancy FAQs: Medical experts answer your questions  From [employer] cy  [id number]  bc (courtesy of [facility])  As of 3/18/20  By Anjana Cleveland 37  Medically reviewed by Society for Maternal-Fetal Medicine       We asked parents-to-be to send us their most pressing questions about coronavirus (COVID-19)  Among them: Is it still safe to deliver in a hospital? What if my ob-gyn has the virus? We sent those questions to the top docs at the Society for Maternal-Fetal Medicine  Here are their answers  The coronavirus (COVID-19) pandemic has been declared a national emergency in the [facility] by [employer]  Moms-to-be like you are concerned about everything from getting medicines to managing disruptions at work  But above and beyond any worry about lifestyle changes is a focus on your health and the impact of COVID-19 on your pregnancy  We asked obstetrics doctors who handle the most complicated pregnancy issues to answer your questions about the coronavirus  Here are their responses, provided by Dr Reese Colón and her colleagues at the Society for [place] [employer]  Am I at more risk for COVID-19 if I'm pregnant? We don't know  Pregnancy does change your immune system in ways that might make you more susceptible to viral respiratory infections like COVID-19  If you become infected, you might also be at higher risk for more severe illness compared to the general population  Although this does not appear to be the case with COVID-19, based on limited data so far, a higher risk has been true for pregnant women with other coronavirus infections (SARS-CoV and MERS-CoV) and other viral respiratory infections, such as flu  It's important to take preventive actions to avoid infection, such as washing your hands often and avoiding people who are sick      How might coronavirus affect my pregnancy? Again, we don't know  Women with other coronavirus infections (such as SARS-CoV) did not have miscarriage or stillbirth at higher rates than the general population  We know that having other respiratory viral infections during pregnancy, such as flu, has been associated with problems like low birth weight and  birth  Also, having a high fever early in pregnancy may increase the risk of certain birth defects  Could I transmit coronavirus to my baby during pregnancy or delivery? We don't know whether you could transmit COVID-19 to your baby before or during delivery  However, among the few case studies of infants born to mothers with COVID-23 published in peer-reviewed literature, none of the infants tested positive for the virus  Also, there was no virus detected in samples of amniotic fluid or breast milk  There have been a few reports of newborns as young as a few days old with infection, suggesting that a mother can transmit the infection to her infant through close contact after delivery  There have been no reports of mother-to-baby transmission for other coronaviruses (MERS-CoV and SARS-CoV), although only limited information is available  Is it safe for me to deliver at a hospital where there have been COVID-19 cases? Yes  We know that COVID-19 is a very scary virus  The good news is that hospitals are taking great precautions to keep patients and healthcare providers safe  When a patient is even suspected to have COVID-19, they are placed in a negative pressure room  (Think of these rooms as vacuums that suck and filter the air so it's safe for the other people in the hospital)  This makes it possible for you to deliver at the hospital without putting you or your baby at risk  Hospitals are also implementing stricter visiting policies to keep patients safe  It's worth calling your hospital to check if there are any new regulations to be aware of      What plans should I make now in case the hospital system is overwhelmed when it's time for me to deliver? This is a great question to talk with your doctor or midwife about when you're 34 to 36 weeks pregnant  Every hospital is making different plans for dealing with this scenario  I work in healthcare  Should I ask my doctor to excuse me from work until the baby is born? What if I work in a school, the travel [employer], or some other high-risk setting? Healthcare facilities should take care to limit the exposure of pregnant employees to patients with confirmed or suspected COVID-19, just as they would with other infectious cases  If you continue working, be sure to follow the CDC's risk assessment and infection control guidelines  If you work in a school, travel [employer], or other high-risk setting, talk with your employer about what it's doing to protect employees and minimize infection risks  Wash your hands often  What if my OB gets COVID-19? If your doctor or midwife tests positive for COVID-19, they will need to be quarantined until they recover and are no longer at risk of transmitting the virus  In this case, you'll be assigned to another OB in your doctor's practice (or you may choose another practitioner yourself)  Ask your new OB or your doctor's office if you should self-quarantine or be tested for the virus  (It will depend on when you last saw your provider and when that person tested positive )    Should we hold off on trying to conceive because of COVID-19? At this time, there's no reason to hold off on trying to get pregnant, but the data we have is really limited  For example, we don't think the virus causes birth defects or increases your risk of miscarriage  But we don't know whether you could transmit COVID-19 to your baby before or during delivery  We also don't know if the virus lives in semen or can be sexually transmitted      We have a babymoon scheduled in the next few months - should we cancel? If you're planning to travel internationally or on a cruise, you should strongly consider canceling  At this time, the virus has reached more than 140 countries, and there are travel bans to [place], most of [place], and [place]  Places where large numbers of people gather are at highest risk, especially airports and cruise ships  If you're planning travel in the [place] , note that any travel setting increases your risk of exposure, and there may be travel bans even in [place] by the time you're scheduled to go  Even if you're state is not blocked, you'll definitely want to avoid traveling to communities where the virus is spreading  To find out where these places are, check The [employer] [place] Times map based on CDC data  For the most current advice to help you avoid exposure, check the CDC's COVID-19 travel page  Will the hospital separate me from my  and keep the baby in quarantine? If you test positive for COVID-19 or have been exposed but have no symptoms, the CDC, [employer] of Obstetricians and Gynecologists, and the Society for [place] [employer] all recommend that you be  from your baby to decrease the risk of transmission to the baby  This would last until you are no longer at risk of transmitting the virus  If you do not have COVID-19 and have not been exposed to the virus, the hospital will not separate you from your   My hospital is restricting visitors and only allowing one support person  If my support person leaves after the delivery, will they be allowed to come back? Every hospital has different policies  Contact your hospital or labor and delivery unit a week or so before delivery to get the most up-to-date restrictions  In general, if your support person needs to leave, they would be allowed back unless they knew they were exposed to COVID-19 after leaving your company    [facility] understands that the coronavirus (COVID-19) pandemic is an evolving story and that your questions will change over time  We'll continue asking moms and dads in our Community what they want to know, and we'll get the answers from experts to keep them - and you - informed and supported  My mom was planning to fly here to help me care for my new baby after delivery  Should I tell her not to come? Yes  If your mom is over 61 or has any serious chronic medical conditions (such as heart disease, lung disease, or diabetes), she is at higher risk of serious illness from COVID-19 and should avoid air travel  And remember that any travel setting increases a person's risk of exposure  So, it may be risky to have her around the baby after she has been traveling  For the most current advice on traveling, check the [facility]'s COVID-19 travel page  [facility] understands that the coronavirus pandemic is an evolving story and that your questions will change over time  We'll continue asking moms and dads in our Community what they want to know, and we'll get the answers from experts to keep them - and you - informed and supported  Pregnancy at 31 to 34 Weeks   AMBULATORY CARE:   What changes are happening to your body: You may continue to have symptoms such as shortness of breath, heartburn, contractions, or swelling of your ankles and feet  You may be gaining about 1 pound a week now  Seek care immediately if:   · You develop a severe headache that does not go away  · You have new or increased vision changes, such as blurred or spotted vision  · You have new or increased swelling in your face or hands  · You have vaginal spotting or bleeding  · Your water broke or you feel warm water gushing or trickling from your vagina  Contact your healthcare provider if:   · You have more than 5 contractions in 1 hour  · You notice any changes in your baby's movements  · You have abdominal cramps, pressure, or tightening  · You have a change in vaginal discharge      · You have chills or a fever     · You have vaginal itching, burning, or pain  · You have yellow, green, white, or foul-smelling vaginal discharge  · You have pain or burning when you urinate, less urine than usual, or pink or bloody urine  · You have questions or concerns about your condition or care  How to care for yourself at this stage of your pregnancy:   · Eat a variety of healthy foods  Healthy foods include fruits, vegetables, whole-grain breads, low-fat dairy foods, beans, lean meats, and fish  Drink liquids as directed  Ask how much liquid to drink each day and which liquids are best for you  Limit caffeine to less than 200 milligrams each day  Limit your intake of fish to 2 servings each week  Choose fish low in mercury such as canned light tuna, shrimp, salmon, cod, or tilapia  Do not  eat fish high in mercury such as swordfish, tilefish, ros mackerel, and shark  · Manage heartburn  by eating 4 or 5 small meals each day instead of large meals  Avoid spicy food  · Manage swelling  by lying down and putting your feet up  · Take prenatal vitamins as directed  Your need for certain vitamins and minerals, such as folic acid, increases during pregnancy  Prenatal vitamins provide some of the extra vitamins and minerals you need  Prenatal vitamins may also help to decrease the risk of certain birth defects  · Talk to your healthcare provider about exercise  Moderate exercise can help you stay fit  Your healthcare provider will help you plan an exercise program that is safe for you during pregnancy  · Do not smoke  If you smoke, it is never too late to quit  Smoking increases your risk of a miscarriage and other health problems during your pregnancy  Smoking can cause your baby to be born too early or weigh less at birth  Ask your healthcare provider for information if you need help quitting  · Do not drink alcohol  Alcohol passes from your body to your baby through the placenta   It can affect your baby's brain development and cause fetal alcohol syndrome (FAS)  FAS is a group of conditions that causes mental, behavior, and growth problems  · Talk to your healthcare provider before you take any medicines  Many medicines may harm your baby if you take them when you are pregnant  Do not take any medicines, vitamins, herbs, or supplements without first talking to your healthcare provider  Never use illegal or street drugs (such as marijuana or cocaine) while you are pregnant  Safety tips during pregnancy:   · Avoid hot tubs and saunas  Do not use a hot tub or sauna while you are pregnant, especially during your first trimester  Hot tubs and saunas may raise your baby's temperature and increase the risk of birth defects  · Avoid toxoplasmosis  This is an infection caused by eating raw meat or being around infected cat feces  It can cause birth defects, miscarriages, and other problems  Wash your hands after you touch raw meat  Make sure any meat is well-cooked before you eat it  Avoid raw eggs and unpasteurized milk  Use gloves or ask someone else to clean your cat's litter box while you are pregnant  Changes that are happening with your baby:  By 34 weeks, your baby may weigh more than 5 pounds  Your baby will be about 12 ½ inches long from the top of the head to the rump (baby's bottom)  Your baby is gaining about ½ pound a week  Your baby's eyes open and close now  Your baby's kicks and movements are more forceful at this time  What you need to know about prenatal care: Your healthcare provider will check your blood pressure and weight  You may also need the following:  · A urine test  may also be done to check for sugar and protein  These can be signs of gestational diabetes or infection  Protein in your urine may also be a sign of preeclampsia  Preeclampsia is a condition that can develop during week 20 or later of your pregnancy   It causes high blood pressure, and it can cause problems with your kidneys and other organs  · A Tdap vaccine  may be recommended by your healthcare provider  · Fundal height  is a measurement of your uterus to check your baby's growth  This number is usually the same as the number of weeks that you have been pregnant  Your healthcare provider may also check your baby's position  · Your baby's heart rate  will be checked  © 2017 [address] [employer] Information is for End User's use only and may not be sold, redistributed or otherwise used for commercial purposes  All illustrations and images included in CareNotes® are the copyrighted property of A D A M [employer] Inc  or Robin Ramirez  The above information is an  only  It is not intended as medical advice for individual conditions or treatments  Talk to your doctor, nurse or pharmacist before following any medical regimen to see if it is safe and effective for you  Covid - 19 instructions    If you are having any of the following     Cough   Shortness of breath   Fever  If traveled internationally or to high risk [place] states  Or been in contact with someone that has     Please call:    [phone]  option [phone]    They will screen you and direct you to the nearest testing location     Should not come to the PCP or OB office without calling that number first        Fetal Movement   WHAT YOU NEED TO KNOW:   Fetal movements are the kicks, rolls, and hiccups of your unborn baby  You may start to feel these movements when you are 20 weeks pregnant  The movements grow stronger and more frequent as your baby grows  Fetal movements show that your unborn baby is getting the oxygen and nutrients he needs before birth  Fewer fetal movements may signal a problem with your baby's health  DISCHARGE INSTRUCTIONS:   Follow up with your healthcare provider or obstetrician as directed:  Write down your questions so you remember to ask them during your visits     Normal fetal movement:  Fetal activity can be described by 4 states, from least to most active  During quiet sleep, your unborn baby may be still for up to 2 hours  During active sleep, he kicks, rolls, and moves often  During the quiet awake state, he may only move his eyes  The active awake state includes strong kicks and rolls  What affects fetal movement:  You may feel your baby move more after you eat, or after you drink caffeine  You may feel your baby move less while you are more active, such as when you exercise  You may also feel fewer movements if you are obese  Certain medicines can change your baby's movements  Tell your healthcare provider about the medicines you are taking  Track fetal movements at home:  Fetal movement is most often felt when you lie quietly on your side  Your healthcare provider may ask you to count movements for 2 hours  He may ask you to track how long it takes for your baby to move 10 times  Keep a log of your baby's movements  Contact your healthcare provider or obstetrician if:   · It takes longer than usual to feel 10 of your unborn baby's movements  · You do not feel your unborn baby move at least 10 times in 2 hours  · The skin on your hands, feet, and around your eyes is more swollen than usual      · You have a headache for at least 24 hours  · Tiny red dots appear on your skin  · Your belly is tender when you press on it  · You have questions or concerns about your condition or care  Return to the emergency department if:   · You do not feel your unborn baby move for 12 hours  · You feel cramping or constant pain in your abdomen  · You have heavy bleeding from your vagina  · You have a severe headache and cannot see clearly  · You are having trouble breathing or are vomiting  · You have a seizure  © 2017 [employer] Information is for End User's use only and may not be sold, redistributed or otherwise used for commercial purposes   All illustrations and images included in CareNotes® are the copyrighted property of A D A M , Inc  or Robin Ramirez  The above information is an  only  It is not intended as medical advice for individual conditions or treatments  Talk to your doctor, nurse or pharmacist before following any medical regimen to see if it is safe and effective for you

## 2020-07-30 ENCOUNTER — TELEPHONE (OUTPATIENT)
Dept: OBGYN CLINIC | Facility: CLINIC | Age: 20
End: 2020-07-30

## 2020-07-30 ENCOUNTER — TELEPHONE (OUTPATIENT)
Dept: PERINATAL CARE | Facility: OTHER | Age: 20
End: 2020-07-30

## 2020-07-30 NOTE — PROGRESS NOTES
Media Pod presents today for routine OB visit at [address] [place] [address]  Blood Pressure: 101/95Rl=326 kg (234 lb); Body mass index is 37 77 kg/m²  TWG=Not found  fetal heart tones 150's,  Fundal height measured 32 cm  Urine dip: trace protein   Abdomen: gravid, soft, non-tender  She reports  Is getting insurance, will complete 28 week labs Denies uterine contractions  Denies vaginal bleeding or leaking of fluid  Reports adequate fetal movement of at least 10 movements in 2 hours once daily  Third trimester bloodwork  A +blood type  Needs to complete her 28 week labs  Vaccinations: Tdap today  Plans Breast feed- needs  to bring in [employer] pump paper  Nexplanon-  Information given for patient to reviewed, follow-up at next appointment   obesity-LDASA  Scheduled for next ultrasound  Today 07/31/2020  Reviewed premature labor precautions and fetal kick counts  Advised to continue medications and return in 2 weeks        Current Outpatient Medications:     aspirin 81 mg chewable tablet, Chew 2 tablets (162 mg total) daily, Disp: 90 tablet, Rfl: 3    Prenatal Vit-Iron Carbonyl-FA (PRENATAL MULTIVITAMIN) TABS, Take 1 tablet by mouth daily, Disp: , Rfl:     Pyridoxine HCl (VITAMIN B-6) 50 MG TABS, Take 0 5 tablets (25 mg total) by mouth 3 (three) times a day, Disp: 90 each, Rfl: 3    vitamin B-12 (CYANOCOBALAMIN) 50 MCG tablet, Take 50 mcg by mouth daily, Disp: , Rfl:       Pregnancy Problems (from 02/13/20 to present)     Problem Noted Resolved    Fetal ultrasound 5/21/2020 by Suleiman Nix MD No    Overview Signed 5/21/2020 10:07 PM by Suleiman Nix MD     3/17/2020 (13w0d) - WNL           Genetic screening 5/21/2020 by Suleiman Nix MD No    Overview Addendum 5/22/2020 10:05 AM by Suleiman Nix MD      Quad screen ordered - Not done by patient - out of window at this point          Nausea and vomiting in pregnancy 4/29/2020 by Juan Oseguera MD No    Overview Addendum 5/22/2020 10:06 AM by Nacho Garcia MD     - Using B6 - Improved 5/22/2020         Contraceptive education 4/29/2020 by Nani Hernandez MD No    Overview Addendum 5/22/2020 10:05 AM by Nacho Garcia MD     - Address at follow up          Obesity in pregnancy, antepartum 3/17/2020 by Alistair Chester MD No    Overview Addendum 5/21/2020 10:09 PM by Nacho Garcia MD     Encouraged healthy diet and exercise in pregnancy  Recommended weight gain 11-20lb  Early DM screen ordered -  Pending  Risk of PEC - LDASA initiated          30 weeks gestation of pregnancy 3/5/2020 by Cb Brown MD No    Overview Addendum 5/21/2020 10:06 PM by Nacho Garcia MD     Prenatal panel complete   Blood type: A positive, H&H 12 5/38 1  Pap smear NILM; GC/CT negative  Influenza vaccine offered, patient declines for now due to insurance coverage

## 2020-07-31 ENCOUNTER — ULTRASOUND (OUTPATIENT)
Dept: PERINATAL CARE | Facility: OTHER | Age: 20
End: 2020-07-31
Payer: COMMERCIAL

## 2020-07-31 ENCOUNTER — OFFICE VISIT (OUTPATIENT)
Dept: OBGYN CLINIC | Facility: CLINIC | Age: 20
End: 2020-07-31

## 2020-07-31 ENCOUNTER — TELEPHONE (OUTPATIENT)
Dept: OBGYN CLINIC | Facility: CLINIC | Age: 20
End: 2020-07-31

## 2020-07-31 VITALS
HEART RATE: 83 BPM | BODY MASS INDEX: 37.61 KG/M2 | HEIGHT: 66 IN | DIASTOLIC BLOOD PRESSURE: 68 MMHG | WEIGHT: 234 LBS | TEMPERATURE: 98 F | SYSTOLIC BLOOD PRESSURE: 101 MMHG

## 2020-07-31 VITALS
HEIGHT: 66 IN | DIASTOLIC BLOOD PRESSURE: 79 MMHG | WEIGHT: 235.2 LBS | TEMPERATURE: 97.4 F | SYSTOLIC BLOOD PRESSURE: 123 MMHG | BODY MASS INDEX: 37.8 KG/M2 | HEART RATE: 98 BPM

## 2020-07-31 DIAGNOSIS — Z36.89 ENCOUNTER FOR ULTRASOUND TO ASSESS FETAL GROWTH: ICD-10-CM

## 2020-07-31 DIAGNOSIS — O99.210 OBESITY IN PREGNANCY, ANTEPARTUM: Primary | ICD-10-CM

## 2020-07-31 DIAGNOSIS — IMO0002 EVALUATE ANATOMY NOT SEEN ON PRIOR SONOGRAM: ICD-10-CM

## 2020-07-31 DIAGNOSIS — O99.210 OBESITY IN PREGNANCY, ANTEPARTUM: ICD-10-CM

## 2020-07-31 DIAGNOSIS — Z3A.32 32 WEEKS GESTATION OF PREGNANCY: Primary | ICD-10-CM

## 2020-07-31 DIAGNOSIS — Z3A.32 32 WEEKS GESTATION OF PREGNANCY: ICD-10-CM

## 2020-07-31 PROCEDURE — 76816 OB US FOLLOW-UP PER FETUS: CPT | Performed by: OBSTETRICS & GYNECOLOGY

## 2020-07-31 PROCEDURE — 90715 TDAP VACCINE 7 YRS/> IM: CPT

## 2020-07-31 PROCEDURE — 99213 OFFICE O/P EST LOW 20 MIN: CPT | Performed by: NURSE PRACTITIONER

## 2020-07-31 PROCEDURE — 99212 OFFICE O/P EST SF 10 MIN: CPT | Performed by: OBSTETRICS & GYNECOLOGY

## 2020-07-31 PROCEDURE — 90471 IMMUNIZATION ADMIN: CPT

## 2020-07-31 NOTE — PATIENT INSTRUCTIONS
Covid - 19 instructions    If you are having any of the following     Cough   Shortness of breath   Fever  If traveled internationally or to high risk [place] states  Or been in contact with someone that has     Please call:    [phone]  option 7    They will screen you and direct you to the nearest testing location     Should not come to the PCP or OB office without calling that number first        Pregnancy at 31 to 34 Weeks   AMBULATORY CARE:   What changes are happening to your body: You may continue to have symptoms such as shortness of breath, heartburn, contractions, or swelling of your ankles and feet  You may be gaining about 1 pound a week now  Seek care immediately if:   · You develop a severe headache that does not go away  · You have new or increased vision changes, such as blurred or spotted vision  · You have new or increased swelling in your face or hands  · You have vaginal spotting or bleeding  · Your water broke or you feel warm water gushing or trickling from your vagina  Contact your healthcare provider if:   · You have more than 5 contractions in 1 hour  · You notice any changes in your baby's movements  · You have abdominal cramps, pressure, or tightening  · You have a change in vaginal discharge  · You have chills or a fever  · You have vaginal itching, burning, or pain  · You have yellow, green, white, or foul-smelling vaginal discharge  · You have pain or burning when you urinate, less urine than usual, or pink or bloody urine  · You have questions or concerns about your condition or care  How to care for yourself at this stage of your pregnancy:   · Eat a variety of healthy foods  Healthy foods include fruits, vegetables, whole-grain breads, low-fat dairy foods, beans, lean meats, and fish  Drink liquids as directed  Ask how much liquid to drink each day and which liquids are best for you  Limit caffeine to less than 200 milligrams each day   Limit your intake of fish to 2 servings each week  Choose fish low in mercury such as canned light tuna, shrimp, salmon, cod, or tilapia  Do not  eat fish high in mercury such as swordfish, tilefish, ros mackerel, and shark  · Manage heartburn  by eating 4 or 5 small meals each day instead of large meals  Avoid spicy food  · Manage swelling  by lying down and putting your feet up  · Take prenatal vitamins as directed  Your need for certain vitamins and minerals, such as folic acid, increases during pregnancy  Prenatal vitamins provide some of the extra vitamins and minerals you need  Prenatal vitamins may also help to decrease the risk of certain birth defects  · Talk to your healthcare provider about exercise  Moderate exercise can help you stay fit  Your healthcare provider will help you plan an exercise program that is safe for you during pregnancy  · Do not smoke  If you smoke, it is never too late to quit  Smoking increases your risk of a miscarriage and other health problems during your pregnancy  Smoking can cause your baby to be born too early or weigh less at birth  Ask your healthcare provider for information if you need help quitting  · Do not drink alcohol  Alcohol passes from your body to your baby through the placenta  It can affect your baby's brain development and cause fetal alcohol syndrome (FAS)  FAS is a group of conditions that causes mental, behavior, and growth problems  · Talk to your healthcare provider before you take any medicines  Many medicines may harm your baby if you take them when you are pregnant  Do not take any medicines, vitamins, herbs, or supplements without first talking to your healthcare provider  Never use illegal or street drugs (such as marijuana or cocaine) while you are pregnant  Safety tips during pregnancy:   · Avoid hot tubs and saunas  Do not use a hot tub or sauna while you are pregnant, especially during your first trimester   Hot tubs and saunas may raise your baby's temperature and increase the risk of birth defects  · Avoid toxoplasmosis  This is an infection caused by eating raw meat or being around infected cat feces  It can cause birth defects, miscarriages, and other problems  Wash your hands after you touch raw meat  Make sure any meat is well-cooked before you eat it  Avoid raw eggs and unpasteurized milk  Use gloves or ask someone else to clean your cat's litter box while you are pregnant  Changes that are happening with your baby:  By 34 weeks, your baby may weigh more than 5 pounds  Your baby will be about 12 ½ inches long from the top of the head to the rump (baby's bottom)  Your baby is gaining about ½ pound a week  Your baby's eyes open and close now  Your baby's kicks and movements are more forceful at this time  What you need to know about prenatal care: Your healthcare provider will check your blood pressure and weight  You may also need the following:  · A urine test  may also be done to check for sugar and protein  These can be signs of gestational diabetes or infection  Protein in your urine may also be a sign of preeclampsia  Preeclampsia is a condition that can develop during week 20 or later of your pregnancy  It causes high blood pressure, and it can cause problems with your kidneys and other organs  · A Tdap vaccine  may be recommended by your healthcare provider  · Fundal height  is a measurement of your uterus to check your baby's growth  This number is usually the same as the number of weeks that you have been pregnant  Your healthcare provider may also check your baby's position  · Your baby's heart rate  will be checked  © 2017 [address] [employer] Information is for End User's use only and may not be sold, redistributed or otherwise used for commercial purposes   All illustrations and images included in CareNotes® are the copyrighted property of [employer]  or [employer]  The above information is an  only  It is not intended as medical advice for individual conditions or treatments  Talk to your doctor, nurse or pharmacist before following any medical regimen to see if it is safe and effective for you  Fetal Movement   WHAT YOU NEED TO KNOW:   Fetal movements are the kicks, rolls, and hiccups of your unborn baby  You may start to feel these movements when you are 20 weeks pregnant  The movements grow stronger and more frequent as your baby grows  Fetal movements show that your unborn baby is getting the oxygen and nutrients he needs before birth  Fewer fetal movements may signal a problem with your baby's health  DISCHARGE INSTRUCTIONS:   Follow up with your healthcare provider or obstetrician as directed:  Write down your questions so you remember to ask them during your visits  Normal fetal movement:  Fetal activity can be described by 4 states, from least to most active  During quiet sleep, your unborn baby may be still for up to 2 hours  During active sleep, he kicks, rolls, and moves often  During the quiet awake state, he may only move his eyes  The active awake state includes strong kicks and rolls  What affects fetal movement:  You may feel your baby move more after you eat, or after you drink caffeine  You may feel your baby move less while you are more active, such as when you exercise  You may also feel fewer movements if you are obese  Certain medicines can change your baby's movements  Tell your healthcare provider about the medicines you are taking  Track fetal movements at home:  Fetal movement is most often felt when you lie quietly on your side  Your healthcare provider may ask you to count movements for 2 hours  He may ask you to track how long it takes for your baby to move 10 times  Keep a log of your baby's movements    Contact your healthcare provider or obstetrician if:   · It takes longer than usual to feel 10 of your unborn baby's movements  · You do not feel your unborn baby move at least 10 times in 2 hours  · The skin on your hands, feet, and around your eyes is more swollen than usual      · You have a headache for at least 24 hours  · Tiny red dots appear on your skin  · Your belly is tender when you press on it  · You have questions or concerns about your condition or care  Return to the emergency department if:   · You do not feel your unborn baby move for 12 hours  · You feel cramping or constant pain in your abdomen  · You have heavy bleeding from your vagina  · You have a severe headache and cannot see clearly  · You are having trouble breathing or are vomiting  · You have a seizure  © 2017 [address] [employer]  Information is for End User's use only and may not be sold, redistributed or otherwise used for commercial purposes  All illustrations and images included in CareNotes® are the copyrighted property of A [employer]  or [employer]  The above information is an  only  It is not intended as medical advice for individual conditions or treatments  Talk to your doctor, nurse or pharmacist before following any medical regimen to see if it is safe and effective for you

## 2020-07-31 NOTE — PROGRESS NOTES
[address]: Ms Cynthia Gross was seen today at [place] [address] for fetal growth and followup missed anatomy ultrasound  See ultrasound report under "OB Procedures" tab    Please don't hesitate to contact our office with any concerns or questions   -Kun Durham MD

## 2020-08-06 ENCOUNTER — TELEPHONE (OUTPATIENT)
Dept: OBGYN CLINIC | Facility: CLINIC | Age: 20
End: 2020-08-06

## 2020-08-06 DIAGNOSIS — O99.210 OBESITY IN PREGNANCY, ANTEPARTUM: Primary | ICD-10-CM

## 2020-08-06 DIAGNOSIS — Z3A.30 30 WEEKS GESTATION OF PREGNANCY: ICD-10-CM

## 2020-08-06 NOTE — TELEPHONE ENCOUNTER
----- Message from Christopher Munguia sent at 8/4/2020  2:10 PM EDT -----   I did not hear back from the patient , please attempt to call and ask if she would do a fasting blood sugar and a hemoglobin A1c  Please let me know    Thanks  ----- Message -----  From: Maryanne Lawrence  Sent: 7/31/2020  11:27 AM EDT  To: PARI Munguia    Pt called stating she threw up the liquid for her glucose test  Would like a call back to discuss a different option

## 2020-08-13 ENCOUNTER — ROUTINE PRENATAL (OUTPATIENT)
Dept: OBGYN CLINIC | Facility: CLINIC | Age: 20
End: 2020-08-13

## 2020-08-13 VITALS
RESPIRATION RATE: 16 BRPM | BODY MASS INDEX: 38.25 KG/M2 | HEART RATE: 100 BPM | DIASTOLIC BLOOD PRESSURE: 83 MMHG | TEMPERATURE: 97.8 F | WEIGHT: 237 LBS | SYSTOLIC BLOOD PRESSURE: 130 MMHG

## 2020-08-13 DIAGNOSIS — Z3A.34 34 WEEKS GESTATION OF PREGNANCY: Primary | ICD-10-CM

## 2020-08-13 PROCEDURE — 99213 OFFICE O/P EST LOW 20 MIN: CPT | Performed by: OBSTETRICS & GYNECOLOGY

## 2020-08-13 NOTE — PROGRESS NOTES
Primo Sanders presents today for routine OB visit at 34w1d  Blood Pressure: 130/83  Ky=700 kg (237 lb); Body mass index is 38 25 kg/m² ; TWG=Not found    ;    Urine dip: no specimen  Abdomen: gravid, soft, non-tender  She reports occ nighttime ctx  Denies uterine contractions  Denies vaginal bleeding or leaking of fluid  Reports adequate fetal movement of at least 10 movements in 2 hours once daily  Third trimester bloodwork needs to do- pt aware  Vaccinations: up to date  Scheduled for next ultrasound none  Reviewed premature labor precautions and fetal kick counts  Advised to continue medications and return in 2 weeks        Current Outpatient Medications:     aspirin 81 mg chewable tablet, Chew 2 tablets (162 mg total) daily, Disp: 90 tablet, Rfl: 3    Prenatal Vit-Iron Carbonyl-FA (PRENATAL MULTIVITAMIN) TABS, Take 1 tablet by mouth daily, Disp: , Rfl:     Pyridoxine HCl (VITAMIN B-6) 50 MG TABS, Take 0 5 tablets (25 mg total) by mouth 3 (three) times a day, Disp: 90 each, Rfl: 3    vitamin B-12 (CYANOCOBALAMIN) 50 MCG tablet, Take 50 mcg by mouth daily, Disp: , Rfl:       Pregnancy Problems (from 02/13/20 to present)     Problem Noted Resolved    Fetal ultrasound 5/21/2020 by Tono Brown MD No    Overview Signed 5/21/2020 10:07 PM by Tono Brown MD     3/17/2020 (13w0d) - WNL           Genetic screening 5/21/2020 by Tono Brown MD No    Overview Addendum 5/22/2020 10:05 AM by Tono Brown MD      Quad screen ordered - Not done by patient - out of window at this point          Nausea and vomiting in pregnancy 4/29/2020 by Franca Resendez MD No    Overview Addendum 5/22/2020 10:06 AM by Tono Brown MD     - Using B6 - Improved 5/22/2020         Contraceptive education 4/29/2020 by Franca Resendez MD No    Overview Addendum 5/22/2020 10:05 AM by Tono Brown MD     - Address at follow up          Obesity in pregnancy, antepartum 3/17/2020 by Cynthia Blanc MD [place] No    Overview Addendum 5/21/2020 10:09 PM by Kaycee Bui MD     Encouraged healthy diet and exercise in pregnancy  Recommended weight gain 11-20lb  Early DM screen ordered -  Pending  Risk of PEC - LDASA initiated          32 weeks gestation of pregnancy 3/5/2020 by Néstor Foote MD No    Overview Addendum 5/21/2020 10:06 PM by Kaycee Bui MD     Prenatal panel complete   Blood type: A positive, H&H 12 5/38 1  Pap smear NILM; GC/CT negative  Influenza vaccine offered, patient declines for now due to insurance coverage

## 2020-08-27 ENCOUNTER — ROUTINE PRENATAL (OUTPATIENT)
Dept: OBGYN CLINIC | Facility: CLINIC | Age: 20
End: 2020-08-27

## 2020-08-27 VITALS
BODY MASS INDEX: 38.41 KG/M2 | HEART RATE: 104 BPM | RESPIRATION RATE: 16 BRPM | TEMPERATURE: 98.4 F | DIASTOLIC BLOOD PRESSURE: 76 MMHG | WEIGHT: 238 LBS | SYSTOLIC BLOOD PRESSURE: 110 MMHG

## 2020-08-27 DIAGNOSIS — Z3A.36 36 WEEKS GESTATION OF PREGNANCY: Primary | ICD-10-CM

## 2020-08-27 PROCEDURE — 87081 CULTURE SCREEN ONLY: CPT | Performed by: OBSTETRICS & GYNECOLOGY

## 2020-08-27 PROCEDURE — 99213 OFFICE O/P EST LOW 20 MIN: CPT | Performed by: OBSTETRICS & GYNECOLOGY

## 2020-08-27 NOTE — PROGRESS NOTES
OB/GYN prenatal visit    S: 22 y o  Kayy Maynard 36w1d here for PN visit  She denies obstetric complaints, including pelvic pain, contractions, vaginal bleeding, loss of fluid, or decreased fetal movement  O:  Vitals:    20 1109   BP: 110/76   Pulse: 104   Resp: 16   Temp: 98 4 °F (36 9 °C)       Gen: no acute distress, nonlabored breathing  Fundal Height (cm): 36 cm  Fetal Heart Rate: 140    A/P:    IUP at 36w1d, baby "Calliope"   GBS collected today  No obstetric complaints today  Contraception: Postpartum Nexplanon (insurance [employer])  Breastfeeding: yes, exclusively  Birth plan: no epidural   Discussed  labor precautions and fetal kick counts    Return to clinic in 1 week    Patient has a birth plan that she will bring to L&D  She would like to have an all natural delivery with no pain medication  She would like to ambulate as much as possible  Partner to cut the cord         Leanne Jacobs MD  2020  11:42 AM

## 2020-08-27 NOTE — PATIENT INSTRUCTIONS
Pregnancy at 28 to 38 Weeks   AMBULATORY CARE:   What changes are happening to your body: You are considered full term at the beginning of 37 weeks  Your breathing may be easier if your baby has moved down into a head-down position  You may need to urinate more often because the baby may be pressing on your bladder  You may also feel more discomfort and get tired easily  Seek care immediately if:   · You develop a severe headache that does not go away  · You have new or increased vision changes, such as blurred or spotted vision  · You have new or increased swelling in your face or hands  · You have vaginal spotting or bleeding  · Your water broke or you feel warm water gushing or trickling from your vagina  Contact your healthcare provider if:   · You have more than 5 contractions in 1 hour  · You notice any changes in your baby's movements  · You have abdominal cramps, pressure, or tightening  · You have a change in vaginal discharge  · You have chills or a fever  · You have vaginal itching, burning, or pain  · You have yellow, green, white, or foul-smelling vaginal discharge  · You have pain or burning when you urinate, less urine than usual, or pink or bloody urine  · You have questions or concerns about your condition or care  How to care for yourself at this stage of your pregnancy:   · Eat a variety of healthy foods  Healthy foods include fruits, vegetables, whole-grain breads, low-fat dairy foods, beans, lean meats, and fish  Drink liquids as directed  Ask how much liquid to drink each day and which liquids are best for you  Limit caffeine to less than 200 milligrams each day  Limit your intake of fish to 2 servings each week  Choose fish low in mercury such as canned light tuna, shrimp, crab, salmon, cod, or tilapia  Do not  eat fish high in mercury such as swordfish, tilefish, ros mackerel, and shark  · Take prenatal vitamins as directed    Your need for certain vitamins and minerals, such as folic acid, increases during pregnancy  Prenatal vitamins provide some of the extra vitamins and minerals you need  Prenatal vitamins may also help to decrease the risk of certain birth defects  · Rest as needed  Put your feet up if you have swelling in your ankles and feet  · Do not smoke  If you smoke, it is never too late to quit  Smoking increases your risk of a miscarriage and other health problems during your pregnancy  Smoking can cause your baby to be born too early or weigh less at birth  Ask your healthcare provider for information if you need help quitting  · Do not drink alcohol  Alcohol passes from your body to your baby through the placenta  It can affect your baby's brain development and cause fetal alcohol syndrome (FAS)  FAS is a group of conditions that causes mental, behavior, and growth problems  · Talk to your healthcare provider before you take any medicines  Many medicines may harm your baby if you take them when you are pregnant  Do not take any medicines, vitamins, herbs, or supplements without first talking to your healthcare provider  Never use illegal or street drugs (such as marijuana or cocaine) while you are pregnant  · Talk to your healthcare provider before you travel  You may not be able to travel in an airplane after 36 weeks  He may also recommend that you avoid long road trips  Safety tips during pregnancy:   · Avoid hot tubs and saunas  Do not use a hot tub or sauna while you are pregnant, especially during your first trimester  Hot tubs and saunas may raise your baby's temperature and increase the risk of birth defects  · Avoid toxoplasmosis  This is an infection caused by eating raw meat or being around infected cat feces  It can cause birth defects, miscarriages, and other problems  Wash your hands after you touch raw meat  Make sure any meat is well-cooked before you eat it   Avoid raw eggs and unpasteurized milk  Use gloves or ask someone else to clean your cat's litter box while you are pregnant  · Ask your healthcare provider about travel  The most comfortable time to travel is during the second trimester  Ask your healthcare provider if you can travel after 36 weeks  You may not be able to travel in an airplane after 36 weeks  He may also recommend that you avoid long road trips  Changes that are happening with your baby:  By 38 weeks, your baby may weigh between 6 and 9 pounds  Your baby may be about 14 inches long from the top of the head to the rump (baby's bottom)  Your baby hears well enough to know your voice  As your baby gets larger, you may feel fewer kicks and more stretching and rolling  Your baby may move into a head-down position  Your baby will also rest lower in your abdomen  What you need to know about prenatal care: Your healthcare provider will check your blood pressure and weight  You may also need the following:  · A urine test  may also be done to check for sugar and protein  These can be signs of gestational diabetes or infection  Protein in your urine may also be a sign of preeclampsia  Preeclampsia is a condition that can develop during week 20 or later of your pregnancy  It causes high blood pressure, and it can cause problems with your kidneys and other organs  · A blood test  may be done to check for anemia (low iron level)  · A Tdap vaccine  may be recommended by your healthcare provider  · A group B strep test  is a test that is done to check for group B strep infection  Group B strep is a type of bacteria that may be found in the vagina or rectum  It can be passed to your baby during delivery if you have it  Your healthcare provider will take swab your vagina or rectum and send the sample to the lab for tests  · Fundal height  is a measurement of your uterus to check your baby's growth   This number is usually the same as the number of weeks that you have been pregnant  Your healthcare provider may also check your baby's position  · Your baby's heart rate  will be checked  © 2017 [employer] Information is for End User's use only and may not be sold, redistributed or otherwise used for commercial purposes  All illustrations and images included in CareNotes® are the copyrighted property of [employer]  or Robin [employer]  The above information is an  only  It is not intended as medical advice for individual conditions or treatments  Talk to your doctor, nurse or pharmacist before following any medical regimen to see if it is safe and effective for you

## 2020-08-28 LAB — EXTERNAL GROUP B STREP ANTIGEN: NEGATIVE

## 2020-08-29 LAB — GP B STREP GENITAL QL CULT: NORMAL

## 2020-09-02 PROBLEM — Z3A.37 37 WEEKS GESTATION OF PREGNANCY: Status: ACTIVE | Noted: 2020-03-05

## 2020-09-02 NOTE — PATIENT INSTRUCTIONS
Covid - 19 instructions    If you are having any of the following     Cough   Shortness of breath   Fever  If traveled internationally or to high risk [place] states  Or been in contact with someone that has     Please call:    [phone]  option [phone]    They will screen you and direct you to the nearest testing location     Should not come to the PCP or OB office without calling that number first        Pregnancy at 35 to 38 Weeks   AMBULATORY CARE:   What changes are happening to your body: You are considered full term at the beginning of 37 weeks  Your breathing may be easier if your baby has moved down into a head-down position  You may need to urinate more often because the baby may be pressing on your bladder  You may also feel more discomfort and get tired easily  Seek care immediately if:   · You develop a severe headache that does not go away  · You have new or increased vision changes, such as blurred or spotted vision  · You have new or increased swelling in your face or hands  · You have vaginal spotting or bleeding  · Your water broke or you feel warm water gushing or trickling from your vagina  Contact your healthcare provider if:   · You have more than 5 contractions in 1 hour  · You notice any changes in your baby's movements  · You have abdominal cramps, pressure, or tightening  · You have a change in vaginal discharge  · You have chills or a fever  · You have vaginal itching, burning, or pain  · You have yellow, green, white, or foul-smelling vaginal discharge  · You have pain or burning when you urinate, less urine than usual, or pink or bloody urine  · You have questions or concerns about your condition or care  How to care for yourself at this stage of your pregnancy:   · Eat a variety of healthy foods  Healthy foods include fruits, vegetables, whole-grain breads, low-fat dairy foods, beans, lean meats, and fish  Drink liquids as directed   Ask how much liquid to drink each day and which liquids are best for you  Limit caffeine to less than 200 milligrams each day  Limit your intake of fish to 2 servings each week  Choose fish low in mercury such as canned light tuna, shrimp, crab, salmon, cod, or tilapia  Do not  eat fish high in mercury such as swordfish, tilefish, ros mackerel, and shark  · Take prenatal vitamins as directed  Your need for certain vitamins and minerals, such as folic acid, increases during pregnancy  Prenatal vitamins provide some of the extra vitamins and minerals you need  Prenatal vitamins may also help to decrease the risk of certain birth defects  · Rest as needed  Put your feet up if you have swelling in your ankles and feet  · Do not smoke  If you smoke, it is never too late to quit  Smoking increases your risk of a miscarriage and other health problems during your pregnancy  Smoking can cause your baby to be born too early or weigh less at birth  Ask your healthcare provider for information if you need help quitting  · Do not drink alcohol  Alcohol passes from your body to your baby through the placenta  It can affect your baby's brain development and cause fetal alcohol syndrome (FAS)  FAS is a group of conditions that causes mental, behavior, and growth problems  · Talk to your healthcare provider before you take any medicines  Many medicines may harm your baby if you take them when you are pregnant  Do not take any medicines, vitamins, herbs, or supplements without first talking to your healthcare provider  Never use illegal or street drugs (such as marijuana or cocaine) while you are pregnant  · Talk to your healthcare provider before you travel  You may not be able to travel in an airplane after 36 weeks  He may also recommend that you avoid long road trips  Safety tips during pregnancy:   · Avoid hot tubs and saunas    Do not use a hot tub or sauna while you are pregnant, especially during your first trimester  Hot tubs and saunas may raise your baby's temperature and increase the risk of birth defects  · Avoid toxoplasmosis  This is an infection caused by eating raw meat or being around infected cat feces  It can cause birth defects, miscarriages, and other problems  Wash your hands after you touch raw meat  Make sure any meat is well-cooked before you eat it  Avoid raw eggs and unpasteurized milk  Use gloves or ask someone else to clean your cat's litter box while you are pregnant  · Ask your healthcare provider about travel  The most comfortable time to travel is during the second trimester  Ask your healthcare provider if you can travel after 36 weeks  You may not be able to travel in an airplane after 36 weeks  He may also recommend that you avoid long road trips  Changes that are happening with your baby:  By 38 weeks, your baby may weigh between 6 and 9 pounds  Your baby may be about 14 inches long from the top of the head to the rump (baby's bottom)  Your baby hears well enough to know your voice  As your baby gets larger, you may feel fewer kicks and more stretching and rolling  Your baby may move into a head-down position  Your baby will also rest lower in your abdomen  What you need to know about prenatal care: Your healthcare provider will check your blood pressure and weight  You may also need the following:  · A urine test  may also be done to check for sugar and protein  These can be signs of gestational diabetes or infection  Protein in your urine may also be a sign of preeclampsia  Preeclampsia is a condition that can develop during week 20 or later of your pregnancy  It causes high blood pressure, and it can cause problems with your kidneys and other organs  · A blood test  may be done to check for anemia (low iron level)  · A Tdap vaccine  may be recommended by your healthcare provider  · A group B strep test  is a test that is done to check for group B strep infection  Group B strep is a type of bacteria that may be found in the vagina or rectum  It can be passed to your baby during delivery if you have it  Your healthcare provider will take swab your vagina or rectum and send the sample to the lab for tests  · Fundal height  is a measurement of your uterus to check your baby's growth  This number is usually the same as the number of weeks that you have been pregnant  Your healthcare provider may also check your baby's position  · Your baby's heart rate  will be checked  © 2017 [employer]  Information is for End User's use only and may not be sold, redistributed or otherwise used for commercial purposes  All illustrations and images included in CareNotes® are the copyrighted property of [employer]  or Robin Ramirez  The above information is an  only  It is not intended as medical advice for individual conditions or treatments  Talk to your doctor, nurse or pharmacist before following any medical regimen to see if it is safe and effective for you  Fetal Movement   WHAT YOU NEED TO KNOW:   Fetal movements are the kicks, rolls, and hiccups of your unborn baby  You may start to feel these movements when you are 20 weeks pregnant  The movements grow stronger and more frequent as your baby grows  Fetal movements show that your unborn baby is getting the oxygen and nutrients he needs before birth  Fewer fetal movements may signal a problem with your baby's health  DISCHARGE INSTRUCTIONS:   Follow up with your healthcare provider or obstetrician as directed:  Write down your questions so you remember to ask them during your visits  Normal fetal movement:  Fetal activity can be described by 4 states, from least to most active  During quiet sleep, your unborn baby may be still for up to 2 hours  During active sleep, he kicks, rolls, and moves often  During the quiet awake state, he may only move his eyes   The active awake state includes strong kicks and rolls  What affects fetal movement:  You may feel your baby move more after you eat, or after you drink caffeine  You may feel your baby move less while you are more active, such as when you exercise  You may also feel fewer movements if you are obese  Certain medicines can change your baby's movements  Tell your healthcare provider about the medicines you are taking  Track fetal movements at home:  Fetal movement is most often felt when you lie quietly on your side  Your healthcare provider may ask you to count movements for 2 hours  He may ask you to track how long it takes for your baby to move 10 times  Keep a log of your baby's movements  Contact your healthcare provider or obstetrician if:   · It takes longer than usual to feel 10 of your unborn baby's movements  · You do not feel your unborn baby move at least 10 times in 2 hours  · The skin on your hands, feet, and around your eyes is more swollen than usual      · You have a headache for at least 24 hours  · Tiny red dots appear on your skin  · Your belly is tender when you press on it  · You have questions or concerns about your condition or care  Return to the emergency department if:   · You do not feel your unborn baby move for 12 hours  · You feel cramping or constant pain in your abdomen  · You have heavy bleeding from your vagina  · You have a severe headache and cannot see clearly  · You are having trouble breathing or are vomiting  · You have a seizure  © 2017 [employer] Information is for End User's use only and may not be sold, redistributed or otherwise used for commercial purposes  All illustrations and images included in CareNotes® are the copyrighted property of [employer]  or Robin Ramirez  The above information is an  only  It is not intended as medical advice for individual conditions or treatments   Talk to your doctor, nurse or pharmacist before following any medical regimen to see if it is safe and effective for you

## 2020-09-02 NOTE — PROGRESS NOTES
Heena Joaquin presents today for routine OB visit at 37w0d  Blood Pressure: 105/71  Kq=930 kg (238 lb); Body mass index is 38 41 kg/m² ; TWG=9 lbs total weight gain  Fetal Heart Rate: 138; Fundal Height (cm): 37 cm  Urine dip:   Abdomen: gravid, soft, non-tender  Denies regular uterine contractions  Denies vaginal bleeding or leaking of fluid  Reports adequate fetal movement of at least 10 movements in 2 hours once daily  patient does report heartburn- will order Pepcid 10 mgs 1 pill BID as needed  Reviewed small meals, avoid fried fatty foods, avoid lying down after eating  Obesity- LDASA 162 mgs daily  Third trimester bloodwork A+ blood type  She still needs to complete FBS and hgb A1 c- she states she will go this week  GBS negative   Contraception-  Patient plans using implanted subdermal contraceptive,  Nexplanon to be placed postpartum  Breast feeding- has breast pump ordered  has information on perineal massage  Vaccinations: has had Tdap vaccine  Last US was 7/31/2020-fetal growth at 44 %  Scheduled for next ultrasound as indicated     Reviewed premature labor precautions and fetal kick counts  Advised to continue medications and return in 1 weeks      Current Outpatient Medications   Medication Instructions    aspirin 162 mg, Oral, Daily    Prenatal Vit-Iron Carbonyl-FA (PRENATAL MULTIVITAMIN) TABS 1 tablet, Oral, Daily         Pregnancy Problems (from 02/13/20 to present)     Problem Noted Resolved    Fetal ultrasound 5/21/2020 by Freddy Arnold MD No    Overview Signed 5/21/2020 10:07 PM by Freddy Arnold MD     3/17/2020 (13w0d) - WNL           Genetic screening 5/21/2020 by Freddy Arnold MD No    Overview Addendum 5/22/2020 10:05 AM by Freddy Arnold MD      Quad screen ordered - Not done by patient - out of window at this point          Nausea and vomiting in pregnancy 4/29/2020 by Dai Echavarria MD No    Overview Addendum 5/22/2020 10:06 AM by Rhonda Sen Judy Cuevas MD     - Using B6 - Improved 5/22/2020         Contraceptive education 4/29/2020 by Rsosy Bill MD No    Overview Addendum 5/22/2020 10:05 AM by Bong Pruitt MD     - Address at follow up          Obesity in pregnancy, antepartum 3/17/2020 by Rafaela Zambrano MD No    Overview Addendum 5/21/2020 10:09 PM by Bong Pruitt MD     Encouraged healthy diet and exercise in pregnancy  Recommended weight gain 11-20lb  Early DM screen ordered -  Pending  Risk of PEC - LDASA initiated          36 weeks gestation of pregnancy 3/5/2020 by Salena Vaughan MD No    Overview Addendum 5/21/2020 10:06 PM by Bong Pruitt MD     Prenatal panel complete   Blood type: A positive, H&H 12 5/38 1  Pap smear NILM; GC/CT negative  Influenza vaccine offered, patient declines for now due to insurance coverage

## 2020-09-03 ENCOUNTER — ROUTINE PRENATAL (OUTPATIENT)
Dept: OBGYN CLINIC | Facility: CLINIC | Age: 20
End: 2020-09-03

## 2020-09-03 VITALS
HEIGHT: 66 IN | BODY MASS INDEX: 38.25 KG/M2 | WEIGHT: 238 LBS | HEART RATE: 102 BPM | TEMPERATURE: 97.8 F | SYSTOLIC BLOOD PRESSURE: 105 MMHG | DIASTOLIC BLOOD PRESSURE: 71 MMHG

## 2020-09-03 DIAGNOSIS — Z3A.37 37 WEEKS GESTATION OF PREGNANCY: ICD-10-CM

## 2020-09-03 DIAGNOSIS — R12 HEART BURN: ICD-10-CM

## 2020-09-03 DIAGNOSIS — O99.210 OBESITY IN PREGNANCY, ANTEPARTUM: Primary | ICD-10-CM

## 2020-09-03 PROCEDURE — 99213 OFFICE O/P EST LOW 20 MIN: CPT | Performed by: NURSE PRACTITIONER

## 2020-09-03 RX ORDER — FAMOTIDINE 10 MG
10 TABLET ORAL 2 TIMES DAILY PRN
Qty: 60 TABLET | Refills: 0 | Status: SHIPPED | OUTPATIENT
Start: 2020-09-03 | End: 2021-07-23 | Stop reason: ALTCHOICE

## 2020-09-04 ENCOUNTER — TRANSCRIBE ORDERS (OUTPATIENT)
Dept: ADMINISTRATIVE | Facility: HOSPITAL | Age: 20
End: 2020-09-04

## 2020-09-04 ENCOUNTER — APPOINTMENT (OUTPATIENT)
Dept: LAB | Facility: CLINIC | Age: 20
End: 2020-09-04
Payer: COMMERCIAL

## 2020-09-04 LAB — GLUCOSE P FAST SERPL-MCNC: 77 MG/DL (ref 65–99)

## 2020-09-04 PROCEDURE — 83036 HEMOGLOBIN GLYCOSYLATED A1C: CPT | Performed by: NURSE PRACTITIONER

## 2020-09-04 PROCEDURE — 36415 COLL VENOUS BLD VENIPUNCTURE: CPT | Performed by: NURSE PRACTITIONER

## 2020-09-04 PROCEDURE — 82947 ASSAY GLUCOSE BLOOD QUANT: CPT | Performed by: NURSE PRACTITIONER

## 2020-09-05 LAB
EST. AVERAGE GLUCOSE BLD GHB EST-MCNC: 114 MG/DL
HBA1C MFR BLD: 5.6 %

## 2020-09-09 NOTE — PROGRESS NOTES
Prosper Pink presents today for routine OB visit at 38w0d  Blood Pressure: 112/68  Ov=840 kg (238 lb 3 2 oz); Body mass index is 38 45 kg/m² ; TWG=Not found  Fetal Heart Rate: 142; Fundal Height (cm): 37 cm  Urine dip:   Abdomen: gravid, soft, non-tender  She reports heart burn today, did not take her Pepcid this morning  Had sugary breakfast this morning, glucosuria on urine dip  She denies headaches, visual changes, abdominal pain  Denies regular uterine contractions  Will get crampy from time to time minute and then stops  Denies vaginal bleeding or leaking of fluid  Reports adequate fetal movement of at least 10 movements in 2 hours once daily  Third trimester bloodwork  A positive blood type,  FBS and hemoglobin A1c within normal    GBS negative  contraception- plans on implanted subdermal contraceptive,  Nexplanon to be placed postpartum   plans on breast-feeding    Perineum  Massage- not performing  Vaccinations:  Has had Tdap    last ultrasound 07/31/2020, growth at 44%  Scheduled for next ultrasound  As indicated  Reviewed  labor precautions and preeclamptic precautions and fetal kick counts  Advised to continue medications and return in 1 weeks        Current Outpatient Medications   Medication Instructions    aspirin 162 mg, Oral, Daily    famotidine (PEPCID) 10 mg, Oral, 2 times daily PRN    Prenatal Vit-Iron Carbonyl-FA (PRENATAL MULTIVITAMIN) TABS 1 tablet, Oral, Daily         Pregnancy Problems (from 02/13/20 to present)     Problem Noted Resolved    Fetal ultrasound 5/21/2020 by Jose Owens MD No    Overview Signed 5/21/2020 10:07 PM by Jose Owens MD     3/17/2020 (13w0d) - WNL           Genetic screening 5/21/2020 by Jose Owens MD No    Overview Addendum 5/22/2020 10:05 AM by Jose Owens MD      Quad screen ordered - Not done by patient - out of window at this point          Nausea and vomiting in pregnancy 4/29/2020 by Candi Sky MD No Overview Addendum 5/22/2020 10:06 AM by Judith Salamanca MD     - Using B6 - Improved 5/22/2020         Contraceptive education 4/29/2020 by Tayla Vera MD No    Overview Addendum 5/22/2020 10:05 AM by Judith Salamanca MD     - Address at follow up          Obesity in pregnancy, antepartum 3/17/2020 by Michel Gonzales MD No    Overview Addendum 5/21/2020 10:09 PM by Judith Salamanca MD     Encouraged healthy diet and exercise in pregnancy  Recommended weight gain 11-20lb  Early DM screen ordered -  Pending  Risk of PEC - LDASA initiated          37 weeks gestation of pregnancy 3/5/2020 by Patricia Brandon MD No    Overview Addendum 5/21/2020 10:06 PM by Judith Salamanca MD     Prenatal panel complete   Blood type: A positive, H&H 12 5/38 1  Pap smear NILM; GC/CT negative  Influenza vaccine offered, patient declines for now due to insurance coverage

## 2020-09-10 ENCOUNTER — ROUTINE PRENATAL (OUTPATIENT)
Dept: OBGYN CLINIC | Facility: CLINIC | Age: 20
End: 2020-09-10

## 2020-09-10 VITALS
WEIGHT: 238.2 LBS | HEART RATE: 100 BPM | TEMPERATURE: 97.5 F | SYSTOLIC BLOOD PRESSURE: 112 MMHG | DIASTOLIC BLOOD PRESSURE: 68 MMHG | BODY MASS INDEX: 38.28 KG/M2 | HEIGHT: 66 IN

## 2020-09-10 DIAGNOSIS — Z3A.38 38 WEEKS GESTATION OF PREGNANCY: ICD-10-CM

## 2020-09-10 DIAGNOSIS — O99.210 OBESITY IN PREGNANCY, ANTEPARTUM: Primary | ICD-10-CM

## 2020-09-10 LAB
SL AMB  POCT GLUCOSE, UA: 500
SL AMB LEUKOCYTE ESTERASE,UA: ABNORMAL
SL AMB POCT BLOOD,UA: ABNORMAL
SL AMB POCT URINE PROTEIN: 1

## 2020-09-10 PROCEDURE — 99213 OFFICE O/P EST LOW 20 MIN: CPT | Performed by: NURSE PRACTITIONER

## 2020-09-10 PROCEDURE — 81002 URINALYSIS NONAUTO W/O SCOPE: CPT | Performed by: NURSE PRACTITIONER

## 2020-09-10 NOTE — PATIENT INSTRUCTIONS
Covid - 19 instructions    If you are having any of the following     Cough   Shortness of breath   Fever  If traveled internationally or to high risk [place] states  Or been in contact with someone that has     Please call:    [phone]  option 7    They will screen you and direct you to the nearest testing location     Should not come to the PCP or OB office without calling that number first        Pregnancy at 44 to 40 Weeks   AMBULATORY CARE:   What changes are happening to your body: You are now getting close to your due date  Your due date is just an estimate of when your baby will be born  Your baby may be born before or after your due date  Your breathing may be easier if your baby has moved down into a head-down position  You may need to urinate more often because the baby may be pressing on your bladder  You may also feel more discomfort and tire easily  You may also be having trouble sleeping  Seek care immediately if:   · You develop a severe headache that does not go away  · You have new or increased vision changes, such as blurred or spotted vision  · You have new or increased swelling in your face or hands  · You have vaginal spotting or bleeding  · Your water broke or you feel warm water gushing or trickling from your vagina  Contact your healthcare provider if:   · You have more than 5 contractions in 1 hour  · You notice any changes in your baby's movements  · You have abdominal cramps, pressure, or tightening  · You have a change in vaginal discharge  · You have chills or a fever  · You have vaginal itching, burning, or pain  · You have yellow, green, white, or foul-smelling vaginal discharge  · You have pain or burning when you urinate, less urine than usual, or pink or bloody urine  · You have questions or concerns about your condition or care  How to care for yourself at this stage of your pregnancy:   · Eat a variety of healthy foods    Healthy foods include fruits, vegetables, whole-grain breads, low-fat dairy foods, beans, lean meats, and fish  Drink liquids as directed  Ask how much liquid to drink each day and which liquids are best for you  Limit caffeine to less than 200 milligrams each day  Limit your intake of fish to 2 servings each week  Choose fish low in mercury such as canned light tuna, shrimp, crab, salmon, cod, or tilapia  Do not  eat fish high in mercury such as swordfish, tilefish, ros mackerel, and shark  · Take prenatal vitamins as directed  Your need for certain vitamins and minerals, such as folic acid, increases during pregnancy  Prenatal vitamins provide some of the extra vitamins and minerals you need  Prenatal vitamins may also help to decrease the risk of certain birth defects  · Rest as needed  Put your feet up if you have swelling in your ankles and feet  · Do not smoke  If you smoke, it is never too late to quit  Smoking increases your risk of a miscarriage and other health problems during your pregnancy  Smoking can cause your baby to be born too early or weigh less at birth  Ask your healthcare provider for information if you need help quitting  · Do not drink alcohol  Alcohol passes from your body to your baby through the placenta  It can affect your baby's brain development and cause fetal alcohol syndrome (FAS)  FAS is a group of conditions that causes mental, behavior, and growth problems  · Talk to your healthcare provider before you take any medicines  Many medicines may harm your baby if you take them when you are pregnant  Do not take any medicines, vitamins, herbs, or supplements without first talking to your healthcare provider  Never use illegal or street drugs (such as marijuana or cocaine) while you are pregnant  · Talk to your healthcare provider before you travel  You may not be able to travel in an airplane after 36 weeks  He may also recommend that you avoid long road trips    Safety tips during pregnancy:   · Avoid hot tubs and saunas  Do not use a hot tub or sauna while you are pregnant, especially during your first trimester  Hot tubs and saunas may raise your baby's temperature and increase the risk of birth defects  · Avoid toxoplasmosis  This is an infection caused by eating raw meat or being around infected cat feces  It can cause birth defects, miscarriages, and other problems  Wash your hands after you touch raw meat  Make sure any meat is well-cooked before you eat it  Avoid raw eggs and unpasteurized milk  Use gloves or ask someone else to clean your cat's litter box while you are pregnant  · Ask your healthcare provider about travel  The most comfortable time to travel is during the second trimester  Ask your healthcare provider if you can travel after 36 weeks  You may not be able to travel in an airplane after 36 weeks  He may also recommend that you avoid long road trips  Changes that are happening with your baby: Your baby is ready to be born  At birth, your baby may weigh about 6 to 9 pounds and be about 19 to 21 inches long  Your baby may be in a head-down position  Your baby will also rest lower in your abdomen  What you need to know about prenatal care: Your healthcare provider will check your blood pressure and weight  You may also need the following:  · A urine test  may also be done to check for sugar and protein  These can be signs of gestational diabetes or infection  Protein in your urine may also be a sign of preeclampsia  Preeclampsia is a condition that can develop during week 20 or later of your pregnancy  It causes high blood pressure, and it can cause problems with your kidneys and other organs  · Your baby's heart rate  will be checked  © 2017 [address] Information is for End User's use only and may not be sold, redistributed or otherwise used for commercial purposes   All illustrations and images included in CareNotes® are the copyrighted property of [employer] M [employer]  or Robin Ramirez  The above information is an  only  It is not intended as medical advice for individual conditions or treatments  Talk to your doctor, nurse or pharmacist before following any medical regimen to see if it is safe and effective for you  Fetal Movement   WHAT YOU NEED TO KNOW:   Fetal movements are the kicks, rolls, and hiccups of your unborn baby  You may start to feel these movements when you are 20 weeks pregnant  The movements grow stronger and more frequent as your baby grows  Fetal movements show that your unborn baby is getting the oxygen and nutrients he needs before birth  Fewer fetal movements may signal a problem with your baby's health  DISCHARGE INSTRUCTIONS:   Follow up with your healthcare provider or obstetrician as directed:  Write down your questions so you remember to ask them during your visits  Normal fetal movement:  Fetal activity can be described by 4 states, from least to most active  During quiet sleep, your unborn baby may be still for up to 2 hours  During active sleep, he kicks, rolls, and moves often  During the quiet awake state, he may only move his eyes  The active awake state includes strong kicks and rolls  What affects fetal movement:  You may feel your baby move more after you eat, or after you drink caffeine  You may feel your baby move less while you are more active, such as when you exercise  You may also feel fewer movements if you are obese  Certain medicines can change your baby's movements  Tell your healthcare provider about the medicines you are taking  Track fetal movements at home:  Fetal movement is most often felt when you lie quietly on your side  Your healthcare provider may ask you to count movements for 2 hours  He may ask you to track how long it takes for your baby to move 10 times  Keep a log of your baby's movements    Contact your healthcare provider or obstetrician if: · It takes longer than usual to feel 10 of your unborn baby's movements  · You do not feel your unborn baby move at least 10 times in 2 hours  · The skin on your hands, feet, and around your eyes is more swollen than usual      · You have a headache for at least 24 hours  · Tiny red dots appear on your skin  · Your belly is tender when you press on it  · You have questions or concerns about your condition or care  Return to the emergency department if:   · You do not feel your unborn baby move for 12 hours  · You feel cramping or constant pain in your abdomen  · You have heavy bleeding from your vagina  · You have a severe headache and cannot see clearly  · You are having trouble breathing or are vomiting  · You have a seizure  © 2017 [address] Information is for End User's use only and may not be sold, redistributed or otherwise used for commercial purposes  All illustrations and images included in CareNotes® are the copyrighted property of A D [employer] M , [employer]  or Robin Ramirez  The above information is an  only  It is not intended as medical advice for individual conditions or treatments  Talk to your doctor, nurse or pharmacist before following any medical regimen to see if it is safe and effective for you

## 2020-09-16 PROBLEM — Z3A.39 39 WEEKS GESTATION OF PREGNANCY: Status: ACTIVE | Noted: 2020-03-05

## 2020-09-16 NOTE — PATIENT INSTRUCTIONS
Pregnancy at 44 to 40 Weeks   [address] [address] :   What changes are happening in my body? You are now getting close to your due date  Your due date is just an estimate of when your baby will be born  Your baby may be born before or after your due date  Your breathing may be easier if your baby has moved down into a head-down position  You may need to urinate more often because the baby may be pressing on your bladder  You may also feel more discomfort and tire easily  You may also be having trouble sleeping  How do I care for myself at this stage of my pregnancy? · Eat a variety of healthy foods  Healthy foods include fruits, vegetables, whole-grain breads, low-fat dairy foods, beans, lean meats, and fish  Drink liquids as directed  Ask how much liquid to drink each day and which liquids are best for you  Limit caffeine to less than 200 milligrams each day  Limit your intake of fish to 2 servings each week  Choose fish low in mercury such as canned light tuna, shrimp, crab, salmon, cod, or tilapia  Do not  eat fish high in mercury such as swordfish, tilefish, ros mackerel, and shark  · Take prenatal vitamins as directed  Your need for certain vitamins and minerals, such as folic acid, increases during pregnancy  Prenatal vitamins provide some of the extra vitamins and minerals you need  Prenatal vitamins may also help to decrease the risk of certain birth defects  · Rest as needed  Put your feet up if you have swelling in your ankles and feet  · Do not smoke  If you smoke, it is never too late to quit  Smoking increases your risk of a miscarriage and other health problems during your pregnancy  Smoking can cause your baby to be born too early or weigh less at birth  Ask your healthcare provider for information if you need help quitting  · Do not drink alcohol  Alcohol passes from your body to your baby through the placenta   It can affect your baby's brain development and cause fetal alcohol syndrome (FAS)  FAS is a group of conditions that causes mental, behavior, and growth problems  · Talk to your healthcare provider before you take any medicines  Many medicines may harm your baby if you take them when you are pregnant  Do not take any medicines, vitamins, herbs, or supplements without first talking to your healthcare provider  Never use illegal or street drugs (such as marijuana or cocaine) while you are pregnant  · Talk to your healthcare provider before you travel  You may not be able to travel in an airplane after 36 weeks  He may also recommend that you avoid long road trips  What are some safety tips during pregnancy? · Avoid hot tubs and saunas  Do not use a hot tub or sauna while you are pregnant, especially during your first trimester  Hot tubs and saunas may raise your baby's temperature and increase the risk of birth defects  · Avoid toxoplasmosis  This is an infection caused by eating raw meat or being around infected cat feces  It can cause birth defects, miscarriages, and other problems  Wash your hands after you touch raw meat  Make sure any meat is well-cooked before you eat it  Avoid raw eggs and unpasteurized milk  Use gloves or ask someone else to clean your cat's litter box while you are pregnant  · Ask your healthcare provider about travel  The most comfortable time to travel is during the second trimester  Ask your healthcare provider if you can travel after 36 weeks  You may not be able to travel in an airplane after 36 weeks  He may also recommend that you avoid long road trips  What changes are happening with my baby? Your baby is ready to be born  At birth, your baby may weigh about 6 to 9 pounds and be about 19 to 21 inches long  Your baby may be in a head-down position  Your baby will also rest lower in your abdomen  What do I need to know about prenatal care? Your healthcare provider will check your blood pressure and weight   You may also need the following:  · A urine test  may also be done to check for sugar and protein  These can be signs of gestational diabetes or infection  Protein in your urine may also be a sign of preeclampsia  Preeclampsia is a condition that can develop during week 20 or later of your pregnancy  It causes high blood pressure, and it can cause problems with your kidneys and other organs  · Your baby's heart rate  will be checked  When should I seek immediate care? · You develop a severe headache that does not go away  · You have new or increased vision changes, such as blurred or spotted vision  · You have new or increased swelling in your face or hands  · You have vaginal spotting or bleeding  · Your water broke or you feel warm water gushing or trickling from your vagina  When should I contact my healthcare provider? · You have more than 5 contractions in 1 hour  · You notice any changes in your baby's movements  · You have abdominal cramps, pressure, or tightening  · You have a change in vaginal discharge  · You have chills or a fever  · You have vaginal itching, burning, or pain  · You have yellow, green, white, or foul-smelling vaginal discharge  · You have pain or burning when you urinate, less urine than usual, or pink or bloody urine  · You have questions or concerns about your condition or care  CARE AGREEMENT:   You have the right to help plan your care  Learn about your health condition and how it may be treated  Discuss treatment options with your caregivers to decide what care you want to receive  You always have the right to refuse treatment  The above information is an  only  It is not intended as medical advice for individual conditions or treatments  Talk to your doctor, nurse or pharmacist before following any medical regimen to see if it is safe and effective for you    © 2017 [address] Information is for End User's use only and may not be sold, redistributed or otherwise used for commercial purposes  All illustrations and images included in CareNotes® are the copyrighted property of A [employer] M [employer]  or Robin Ramirez

## 2020-09-17 ENCOUNTER — ROUTINE PRENATAL (OUTPATIENT)
Dept: OBGYN CLINIC | Facility: CLINIC | Age: 20
End: 2020-09-17

## 2020-09-17 VITALS
TEMPERATURE: 97.8 F | RESPIRATION RATE: 16 BRPM | WEIGHT: 241.4 LBS | HEART RATE: 100 BPM | BODY MASS INDEX: 38.96 KG/M2 | DIASTOLIC BLOOD PRESSURE: 80 MMHG | SYSTOLIC BLOOD PRESSURE: 120 MMHG

## 2020-09-17 DIAGNOSIS — Z3A.39 39 WEEKS GESTATION OF PREGNANCY: Primary | ICD-10-CM

## 2020-09-17 PROCEDURE — 99213 OFFICE O/P EST LOW 20 MIN: CPT | Performed by: OBSTETRICS & GYNECOLOGY

## 2020-09-17 NOTE — PROGRESS NOTES
OB/GYN  PN Visit  Ana Jaeger  [id number]  9/17/2020  9:11 AM  Carrie Campbell MD    S: 22 y o  Gilberto Mendoza 39w1d here for PN visit  Patient reports occasional contractions, but none regular  She denies any loss of fluid or vaginal bleeding  She reports excellent fetal movement  Reviewed membrane stripping with patient today patient is interested to do this today  O:  Vitals:    09/17/20 0851   BP: 120/80   Pulse: 100   Resp: 16   Temp: 97 8 °F (36 6 °C)       Gen: no acute distress, nonlabored breathing, pleasant demeanor  OB exam completed: fundal height 39cm,  bpm  SVE: 1-2/50/-3    A/P:  #1  39w1d GESTATION  GBS negative reviewed  Membrane stripping performed today per patient request- reviewed labor precautions, that vaginal spotting will be normal and to call with any questions  Labor induction set up for Saturday, 9/19/2020 at 8pm  Reviewed induction in office including methods of cervical ripening such as ayon blb and cytotec  We reviewed pitocin  Patient would like to not do epidural and plans of breast feeding  Fetal kick counts reviewed  COVID 19 and hospital policies including guest policy and masking were discussed    #2   Contraception: Patient desires nexplanon    Carrie Campbell MD  9/17/2020  9:11 AM

## 2020-09-19 ENCOUNTER — HOSPITAL ENCOUNTER (OUTPATIENT)
Dept: LABOR AND DELIVERY | Facility: HOSPITAL | Age: 20
Discharge: HOME | DRG: 560 | End: 2020-09-19
Payer: COMMERCIAL

## 2020-09-19 ENCOUNTER — HOSPITAL ENCOUNTER (OUTPATIENT)
Facility: HOSPITAL | Age: 20
Discharge: HOME | DRG: 560 | End: 2020-09-19
Attending: OBSTETRICS & GYNECOLOGY | Admitting: OBSTETRICS & GYNECOLOGY
Payer: COMMERCIAL

## 2020-09-19 VITALS
HEART RATE: 99 BPM | TEMPERATURE: 98 F | RESPIRATION RATE: 18 BRPM | DIASTOLIC BLOOD PRESSURE: 72 MMHG | SYSTOLIC BLOOD PRESSURE: 124 MMHG

## 2020-09-19 PROCEDURE — 99214 OFFICE O/P EST MOD 30 MIN: CPT

## 2020-09-19 RX ORDER — SODIUM CHLORIDE, SODIUM LACTATE, POTASSIUM CHLORIDE, CALCIUM CHLORIDE 600; 310; 30; 20 MG/100ML; MG/100ML; MG/100ML; MG/100ML
125 INJECTION, SOLUTION INTRAVENOUS CONTINUOUS
Status: DISCONTINUED | OUTPATIENT
Start: 2020-09-19 | End: 2020-09-20 | Stop reason: HOSPADM

## 2020-09-19 RX ORDER — ONDANSETRON 2 MG/ML
4 INJECTION INTRAMUSCULAR; INTRAVENOUS EVERY 6 HOURS PRN
Status: DISCONTINUED | OUTPATIENT
Start: 2020-09-19 | End: 2020-09-20 | Stop reason: HOSPADM

## 2020-09-20 ENCOUNTER — ANESTHESIA (INPATIENT)
Dept: ANESTHESIOLOGY | Facility: HOSPITAL | Age: 20
DRG: 560 | End: 2020-09-20
Payer: COMMERCIAL

## 2020-09-20 ENCOUNTER — HOSPITAL ENCOUNTER (INPATIENT)
Facility: HOSPITAL | Age: 20
LOS: 3 days | Discharge: HOME | DRG: 560 | End: 2020-09-23
Attending: OBSTETRICS & GYNECOLOGY | Admitting: OBSTETRICS & GYNECOLOGY
Payer: COMMERCIAL

## 2020-09-20 ENCOUNTER — ANESTHESIA EVENT (INPATIENT)
Dept: ANESTHESIOLOGY | Facility: HOSPITAL | Age: 20
DRG: 560 | End: 2020-09-20
Payer: COMMERCIAL

## 2020-09-20 ENCOUNTER — HOSPITAL ENCOUNTER (OUTPATIENT)
Dept: LABOR AND DELIVERY | Facility: HOSPITAL | Age: 20
Discharge: HOME | DRG: 560 | End: 2020-09-20
Payer: COMMERCIAL

## 2020-09-20 DIAGNOSIS — Z3A.39 39 WEEKS GESTATION OF PREGNANCY: ICD-10-CM

## 2020-09-20 LAB
ABO GROUP BLD: NORMAL
AMPHETAMINES SERPL QL SCN: NEGATIVE
BARBITURATES UR QL: NEGATIVE
BASOPHILS # BLD AUTO: 0.02 THOUSANDS/ΜL (ref 0–0.1)
BASOPHILS NFR BLD AUTO: 0 % (ref 0–1)
BENZODIAZ UR QL: NEGATIVE
BLD GP AB SCN SERPL QL: NEGATIVE
COCAINE UR QL: NEGATIVE
EOSINOPHIL # BLD AUTO: 0.09 THOUSAND/ΜL (ref 0–0.61)
EOSINOPHIL NFR BLD AUTO: 1 % (ref 0–6)
ERYTHROCYTE [DISTWIDTH] IN BLOOD BY AUTOMATED COUNT: 13.9 % (ref 11.6–15.1)
HCT VFR BLD AUTO: 33.8 % (ref 34.8–46.1)
HGB BLD-MCNC: 10.7 G/DL (ref 11.5–15.4)
IMM GRANULOCYTES # BLD AUTO: 0.04 THOUSAND/UL (ref 0–0.2)
IMM GRANULOCYTES NFR BLD AUTO: 1 % (ref 0–2)
LYMPHOCYTES # BLD AUTO: 1.91 THOUSANDS/ΜL (ref 0.6–4.47)
LYMPHOCYTES NFR BLD AUTO: 25 % (ref 14–44)
MCH RBC QN AUTO: 25.2 PG (ref 26.8–34.3)
MCHC RBC AUTO-ENTMCNC: 31.7 G/DL (ref 31.4–37.4)
MCV RBC AUTO: 80 FL (ref 82–98)
METHADONE UR QL: NEGATIVE
MONOCYTES # BLD AUTO: 0.64 THOUSAND/ΜL (ref 0.17–1.22)
MONOCYTES NFR BLD AUTO: 8 % (ref 4–12)
NEUTROPHILS # BLD AUTO: 4.98 THOUSANDS/ΜL (ref 1.85–7.62)
NEUTS SEG NFR BLD AUTO: 65 % (ref 43–75)
NRBC BLD AUTO-RTO: 0 /100 WBCS
OPIATES UR QL SCN: NEGATIVE
OXYCODONE+OXYMORPHONE UR QL SCN: NEGATIVE
PCP UR QL: NEGATIVE
PLATELET # BLD AUTO: 418 THOUSANDS/UL (ref 149–390)
PMV BLD AUTO: 10.4 FL (ref 8.9–12.7)
RBC # BLD AUTO: 4.24 MILLION/UL (ref 3.81–5.12)
RH BLD: POSITIVE
SPECIMEN EXPIRATION DATE: NORMAL
THC UR QL: NEGATIVE
WBC # BLD AUTO: 7.68 THOUSAND/UL (ref 4.31–10.16)

## 2020-09-20 PROCEDURE — 85025 COMPLETE CBC W/AUTO DIFF WBC: CPT | Performed by: STUDENT IN AN ORGANIZED HEALTH CARE EDUCATION/TRAINING PROGRAM

## 2020-09-20 PROCEDURE — 86901 BLOOD TYPING SEROLOGIC RH(D): CPT | Performed by: STUDENT IN AN ORGANIZED HEALTH CARE EDUCATION/TRAINING PROGRAM

## 2020-09-20 PROCEDURE — 10907ZC DRAINAGE OF AMNIOTIC FLUID, THERAPEUTIC FROM PRODUCTS OF CONCEPTION, VIA NATURAL OR ARTIFICIAL OPENING: ICD-10-PCS | Performed by: OBSTETRICS & GYNECOLOGY

## 2020-09-20 PROCEDURE — 86850 RBC ANTIBODY SCREEN: CPT | Performed by: STUDENT IN AN ORGANIZED HEALTH CARE EDUCATION/TRAINING PROGRAM

## 2020-09-20 PROCEDURE — 86900 BLOOD TYPING SEROLOGIC ABO: CPT | Performed by: STUDENT IN AN ORGANIZED HEALTH CARE EDUCATION/TRAINING PROGRAM

## 2020-09-20 PROCEDURE — 4A1HXCZ MONITORING OF PRODUCTS OF CONCEPTION, CARDIAC RATE, EXTERNAL APPROACH: ICD-10-PCS | Performed by: OBSTETRICS & GYNECOLOGY

## 2020-09-20 PROCEDURE — NC001 PR NO CHARGE: Performed by: OBSTETRICS & GYNECOLOGY

## 2020-09-20 PROCEDURE — 3E0P7VZ INTRODUCTION OF HORMONE INTO FEMALE REPRODUCTIVE, VIA NATURAL OR ARTIFICIAL OPENING: ICD-10-PCS | Performed by: OBSTETRICS & GYNECOLOGY

## 2020-09-20 PROCEDURE — 80307 DRUG TEST PRSMV CHEM ANLYZR: CPT | Performed by: OBSTETRICS & GYNECOLOGY

## 2020-09-20 PROCEDURE — 3E033VJ INTRODUCTION OF OTHER HORMONE INTO PERIPHERAL VEIN, PERCUTANEOUS APPROACH: ICD-10-PCS | Performed by: OBSTETRICS & GYNECOLOGY

## 2020-09-20 PROCEDURE — 86592 SYPHILIS TEST NON-TREP QUAL: CPT | Performed by: STUDENT IN AN ORGANIZED HEALTH CARE EDUCATION/TRAINING PROGRAM

## 2020-09-20 RX ORDER — SODIUM CHLORIDE, SODIUM LACTATE, POTASSIUM CHLORIDE, CALCIUM CHLORIDE 600; 310; 30; 20 MG/100ML; MG/100ML; MG/100ML; MG/100ML
125 INJECTION, SOLUTION INTRAVENOUS CONTINUOUS
Status: DISCONTINUED | OUTPATIENT
Start: 2020-09-20 | End: 2020-09-21

## 2020-09-20 RX ORDER — BUTORPHANOL TARTRATE 1 MG/ML
1 INJECTION, SOLUTION INTRAMUSCULAR; INTRAVENOUS
Status: DISCONTINUED | OUTPATIENT
Start: 2020-09-20 | End: 2020-09-21

## 2020-09-20 RX ORDER — FAMOTIDINE 20 MG/1
10 TABLET, FILM COATED ORAL 2 TIMES DAILY PRN
Status: DISCONTINUED | OUTPATIENT
Start: 2020-09-20 | End: 2020-09-23 | Stop reason: HOSPADM

## 2020-09-20 RX ORDER — PROMETHAZINE HYDROCHLORIDE 25 MG/ML
12.5 INJECTION, SOLUTION INTRAMUSCULAR; INTRAVENOUS EVERY 6 HOURS PRN
Status: DISCONTINUED | OUTPATIENT
Start: 2020-09-20 | End: 2020-09-21

## 2020-09-20 RX ORDER — OXYTOCIN/RINGER'S LACTATE 30/500 ML
1-30 PLASTIC BAG, INJECTION (ML) INTRAVENOUS
Status: DISCONTINUED | OUTPATIENT
Start: 2020-09-20 | End: 2020-09-21

## 2020-09-20 RX ORDER — ONDANSETRON 2 MG/ML
4 INJECTION INTRAMUSCULAR; INTRAVENOUS EVERY 6 HOURS PRN
Status: DISCONTINUED | OUTPATIENT
Start: 2020-09-20 | End: 2020-09-20

## 2020-09-20 RX ADMIN — MISOPROSTOL 25 MCG: 100 TABLET ORAL at 10:19

## 2020-09-20 RX ADMIN — SODIUM CHLORIDE, SODIUM LACTATE, POTASSIUM CHLORIDE, AND CALCIUM CHLORIDE 125 ML/HR: .6; .31; .03; .02 INJECTION, SOLUTION INTRAVENOUS at 20:45

## 2020-09-20 RX ADMIN — SODIUM CHLORIDE, SODIUM LACTATE, POTASSIUM CHLORIDE, AND CALCIUM CHLORIDE 125 ML/HR: .6; .31; .03; .02 INJECTION, SOLUTION INTRAVENOUS at 12:26

## 2020-09-20 RX ADMIN — BUTORPHANOL TARTRATE 1 MG: 1 INJECTION, SOLUTION INTRAMUSCULAR; INTRAVENOUS at 21:10

## 2020-09-20 RX ADMIN — PROMETHAZINE HYDROCHLORIDE 12.5 MG: 25 INJECTION INTRAMUSCULAR; INTRAVENOUS at 21:10

## 2020-09-20 RX ADMIN — Medication 2 MILLI-UNITS/MIN: at 14:39

## 2020-09-20 RX ADMIN — SODIUM CHLORIDE, SODIUM LACTATE, POTASSIUM CHLORIDE, AND CALCIUM CHLORIDE 125 ML/HR: .6; .31; .03; .02 INJECTION, SOLUTION INTRAVENOUS at 09:28

## 2020-09-20 NOTE — OB LABOR/OXYTOCIN SAFETY PROGRESS
Labor Progress Note - Elif Kilgore 22 y o  female MRN: [id number]    Unit/Bed#: -01 Encounter: [id number]       Contraction Frequency (minutes): irregular  Contraction Quality: Mild  Tachysystole: No   Cervical Dilation: 5        Cervical Effacement: 70  Fetal Station: -2  Baseline Rate: 135 bpm  Fetal Heart Rate: 140 BPM  FHR Category: Category I               Vital Signs:   Vitals:    09/20/20 1112   BP: 121/78   Pulse: 84   Resp: 18   Temp: 98 °F (36 7 °C)           Notes/comments: ayon balloon out  Cervical exam as above  Plan to start pitocin titration after 1pm which will be 3h from first cytotec placement          Ray Posada MD 9/20/2020 12:21 PM

## 2020-09-20 NOTE — OB LABOR/OXYTOCIN SAFETY PROGRESS
Labor Progress Note - Fabio Barton 22 y o  female MRN: [id number]    Unit/Bed#: -01 Encounter: [id number]    Dose (ayesha-units/min) Oxytocin: 6 ayesha-units/min  Contraction Frequency (minutes): 2 5-3  Contraction Quality: Moderate  Tachysystole: No   Cervical Dilation: 6        Cervical Effacement: 80  Fetal Station: -2  Baseline Rate: 155 bpm  Fetal Heart Rate: 156 BPM  FHR Category: Category I               Vital Signs:   Vitals:    09/20/20 1802   BP: 113/57   Pulse: 100   Resp: 20   Temp: 98 5 °F (36 9 °C)           Notes/comments: SVE as noted above  Amniotomy done for clear fluid  FHT category 1  Pit at 6  Ctx every 2 5-3 minutes  Patient planning delivery w/o epidural  Will continue to monitor her        Melissa Beal MD 9/20/2020 6:14 PM

## 2020-09-20 NOTE — OB LABOR/OXYTOCIN SAFETY PROGRESS
Labor Progress Note - Antolin Fisher 22 y o  female MRN: [id number]    Unit/Bed#: -01 Encounter: [id number]       Contraction Frequency (minutes): irregular  Contraction Quality: Mild  Tachysystole: No   Cervical Dilation: 5        Cervical Effacement: 70  Fetal Station: -2  Baseline Rate: 130 bpm  Fetal Heart Rate: 160 BPM  FHR Category: Category I               Vital Signs:   Vitals:    09/20/20 1221   BP: 114/57   Pulse: 84   Resp:    Temp:            Notes/comments:  Patient now 4 hours removed from placement for Cytotec    Plan to start Pitocin titration       Sylvester Perez MD 9/20/2020 2:26 PM

## 2020-09-20 NOTE — H&P
[address] [facility] 22 y o  female MRN: [id number]  Unit/Bed#: -01 Encounter: [id number]    Assessment: 22 y o  Katie Shaikh at 39w4d admitted for elective induction of labor at  E: 2  FHT: Category I  Clinical EFW: 7lbs ; Vertex confirmed by TAUS  GBS status: Negative   Postpartum contraception plan: Nexplanon    Plan:   · Admit  · CBC, RPR, Type & Screen  · Analgesia at maternal request - patient is currently planning to labor without epidural  · Pelaez balloon with Cytotec for labor induction  · Antibiotics not indicated  · Clear liquid diet  · IVF: LR @125cc/hr    Dr Jessica Lopez aware      Chief Complaint:  I am here for my induction    HPI: Carlos Manuel Archer is a 22 y o  Katie Shaikh with an ANNAMARIE of 2020, by Last Menstrual Period at 39w4d who is being admitted for elective induction of labor  She denies having uterine contractions, has no LOF, and reports no VB  She states she has felt good FM  Pregnancy has been uncomplicated  Patient Active Problem List   Diagnosis    39 weeks gestation of pregnancy    Obesity in pregnancy, antepartum    Nausea and vomiting in pregnancy    Contraceptive education    Fetal ultrasound    Genetic screening    Heart burn       Baby complications/comments: none    Review of Systems   Constitutional: Negative for chills and fever  Eyes: Negative for visual disturbance  Respiratory: Negative for shortness of breath  Cardiovascular: Negative for chest pain  Gastrointestinal: Negative for abdominal pain, constipation, diarrhea, nausea and vomiting  Genitourinary: Negative for dysuria and hematuria  Neurological: Negative for headaches         OB History    Para Term  AB Living   1 0 0 0 0 0   SAB TAB Ectopic Multiple Live Births   0 0 0 0 0      # Outcome Date GA Lbr Yandel/2nd Weight Sex Delivery Anes PTL Lv   1 Current                Past Medical History:   Diagnosis Date    Anemia     Varicella     vaccinated       Past Surgical History:   Procedure Laterality Date    FRACTURE SURGERY Right 2012    Rachael Dryer and had surgery with plates and 6 screws       Social History     Tobacco Use    Smoking status: Former Smoker     Types: Cigarettes     Last attempt to quit: 2020     Years since quittin 6    Smokeless tobacco: Never Used   Substance Use Topics    Alcohol use: No       No Known Allergies    Medications Prior to Admission   Medication    aspirin 81 mg chewable tablet    famotidine (PEPCID) 10 mg tablet    Prenatal Vit-Iron Carbonyl-FA (PRENATAL MULTIVITAMIN) TABS       Objective:  Temp:  [98 °F (36 7 °C)-98 1 °F (36 7 °C)] 98 1 °F (36 7 °C)  HR:  [] 109  Resp:  [18-20] 20  BP: (105-124)/(70-72) 105/70  Body mass index is 38 74 kg/m²  Physical Exam:  Physical Exam  Constitutional:       Appearance: She is well-developed  Genitourinary:      Vulva normal       No vaginal discharge  HENT:      Head: Normocephalic and atraumatic  Cardiovascular:      Rate and Rhythm: Normal rate  Pulmonary:      Effort: Pulmonary effort is normal    Abdominal:      Palpations: Abdomen is soft  Tenderness: There is no abdominal tenderness  Neurological:      Mental Status: She is alert and oriented to person, place, and time  Skin:     General: Skin is warm and dry  Psychiatric:         Behavior: Behavior normal    Vitals signs reviewed  Exam conducted with a chaperone present            SVE: /-2       FHT:  Baseline Rate: 130 bpm  Variability: Moderate 6-25 bpm  Accelerations: Periodic, 15 x 15 or greater  Decelerations: None  FHR Category: Category I    TOCO:   Contraction Frequency (minutes): 2-3  Contraction Duration (seconds): 30  Contraction Quality: Mild    Lab Results   Component Value Date    WBC 6 70 03/10/2020    HGB 12 5 03/10/2020    HCT 38 1 03/10/2020     03/10/2020     No results found for: NA, K, CL, CO2, BUN, CREATININE, GLUCOSE, AST, ALT  Prenatal Labs: Reviewed      Blood type:  A positive  Antibody:  Negative  GBS:  Negative  HIV:  Nonreactive  Rubella: Immune  VDRL/RPR: Non reactive  HBsAg: Negative  Chlamydia: Negative  Gonorrhea: Negative  Diabetes 1 hour screen:  77  3 hour glucose:  Not applicable  Platelets:  859  Hgb:  12 5  >2 Midnights  INPATIENT     Signature/Title: Steve Macdonald MD  Date: 9/20/2020  Time: 9:57 AM

## 2020-09-21 LAB
BASE EXCESS BLDCOA CALC-SCNC: -3.8 MMOL/L (ref 3–11)
BASE EXCESS BLDCOV CALC-SCNC: -4.1 MMOL/L (ref 1–9)
HCO3 BLDCOA-SCNC: 21.6 MMOL/L (ref 17.3–27.3)
HCO3 BLDCOV-SCNC: 20.6 MMOL/L (ref 12.2–28.6)
O2 CT VFR BLDCOA CALC: 12.2 ML/DL
OXYHGB MFR BLDCOA: 54.2 %
OXYHGB MFR BLDCOV: 63.9 %
PCO2 BLDCOA: 40.4 MM[HG] (ref 30–60)
PCO2 BLDCOV: 36.9 MM HG (ref 27–43)
PH BLDCOA: 7.35 [PH] (ref 7.23–7.43)
PH BLDCOV: 7.37 [PH] (ref 7.19–7.49)
PO2 BLDCOA: 23.8 MM HG (ref 5–25)
PO2 BLDCOV: 28.1 MM HG (ref 15–45)
RPR SER QL: NORMAL
SAO2 % BLDCOV: 14.1 ML/DL

## 2020-09-21 PROCEDURE — 0KQM0ZZ REPAIR PERINEUM MUSCLE, OPEN APPROACH: ICD-10-PCS | Performed by: OBSTETRICS & GYNECOLOGY

## 2020-09-21 PROCEDURE — 99024 POSTOP FOLLOW-UP VISIT: CPT | Performed by: OBSTETRICS & GYNECOLOGY

## 2020-09-21 PROCEDURE — 82805 BLOOD GASES W/O2 SATURATION: CPT | Performed by: OBSTETRICS & GYNECOLOGY

## 2020-09-21 PROCEDURE — 59409 OBSTETRICAL CARE: CPT | Performed by: OBSTETRICS & GYNECOLOGY

## 2020-09-21 RX ORDER — PHENYLEPHRINE HCL IN 0.9% NACL 1 MG/10 ML
100 SYRINGE (ML) INTRAVENOUS ONCE AS NEEDED
Status: DISCONTINUED | OUTPATIENT
Start: 2020-09-21 | End: 2020-09-21

## 2020-09-21 RX ORDER — DIPHENHYDRAMINE HCL 25 MG
25 TABLET ORAL EVERY 6 HOURS PRN
Status: DISCONTINUED | OUTPATIENT
Start: 2020-09-21 | End: 2020-09-23 | Stop reason: HOSPADM

## 2020-09-21 RX ORDER — BISACODYL 10 MG
10 SUPPOSITORY, RECTAL RECTAL DAILY PRN
Status: DISCONTINUED | OUTPATIENT
Start: 2020-09-21 | End: 2020-09-23 | Stop reason: HOSPADM

## 2020-09-21 RX ORDER — ONDANSETRON 2 MG/ML
4 INJECTION INTRAMUSCULAR; INTRAVENOUS EVERY 6 HOURS PRN
Status: DISCONTINUED | OUTPATIENT
Start: 2020-09-21 | End: 2020-09-23 | Stop reason: HOSPADM

## 2020-09-21 RX ORDER — SODIUM CHLORIDE FOR INHALATION 0.9 %
VIAL, NEBULIZER (ML) INHALATION
Status: DISPENSED
Start: 2020-09-21 | End: 2020-09-21

## 2020-09-21 RX ORDER — CALCIUM CARBONATE 200(500)MG
1000 TABLET,CHEWABLE ORAL 3 TIMES DAILY PRN
Status: DISCONTINUED | OUTPATIENT
Start: 2020-09-21 | End: 2020-09-23 | Stop reason: HOSPADM

## 2020-09-21 RX ORDER — PROMETHAZINE HYDROCHLORIDE 25 MG/ML
12.5 INJECTION, SOLUTION INTRAMUSCULAR; INTRAVENOUS EVERY 4 HOURS PRN
Status: DISCONTINUED | OUTPATIENT
Start: 2020-09-21 | End: 2020-09-21

## 2020-09-21 RX ORDER — IBUPROFEN 600 MG/1
600 TABLET ORAL EVERY 6 HOURS PRN
Status: DISCONTINUED | OUTPATIENT
Start: 2020-09-21 | End: 2020-09-23 | Stop reason: HOSPADM

## 2020-09-21 RX ORDER — ACETAMINOPHEN 325 MG/1
650 TABLET ORAL EVERY 6 HOURS PRN
Status: DISCONTINUED | OUTPATIENT
Start: 2020-09-21 | End: 2020-09-23 | Stop reason: HOSPADM

## 2020-09-21 RX ORDER — SIMETHICONE 80 MG
80 TABLET,CHEWABLE ORAL EVERY 6 HOURS PRN
Status: DISCONTINUED | OUTPATIENT
Start: 2020-09-21 | End: 2020-09-23 | Stop reason: HOSPADM

## 2020-09-21 RX ORDER — LIDOCAINE HYDROCHLORIDE AND EPINEPHRINE 20; 5 MG/ML; UG/ML
INJECTION, SOLUTION EPIDURAL; INFILTRATION; INTRACAUDAL; PERINEURAL AS NEEDED
Status: DISCONTINUED | OUTPATIENT
Start: 2020-09-21 | End: 2020-09-23 | Stop reason: HOSPADM

## 2020-09-21 RX ORDER — DIAPER,BRIEF,INFANT-TODD,DISP
1 EACH MISCELLANEOUS 4 TIMES DAILY PRN
Status: DISCONTINUED | OUTPATIENT
Start: 2020-09-21 | End: 2020-09-23 | Stop reason: HOSPADM

## 2020-09-21 RX ORDER — DOCUSATE SODIUM 100 MG/1
100 CAPSULE, LIQUID FILLED ORAL 2 TIMES DAILY
Status: DISCONTINUED | OUTPATIENT
Start: 2020-09-21 | End: 2020-09-23 | Stop reason: HOSPADM

## 2020-09-21 RX ORDER — ROPIVACAINE HYDROCHLORIDE 2 MG/ML
INJECTION, SOLUTION EPIDURAL; INFILTRATION; PERINEURAL AS NEEDED
Status: DISCONTINUED | OUTPATIENT
Start: 2020-09-21 | End: 2020-09-23 | Stop reason: HOSPADM

## 2020-09-21 RX ADMIN — LIDOCAINE HYDROCHLORIDE AND EPINEPHRINE 3 ML: 20; 5 INJECTION, SOLUTION EPIDURAL; INFILTRATION; INTRACAUDAL; PERINEURAL at 01:39

## 2020-09-21 RX ADMIN — DOCUSATE SODIUM 100 MG: 100 CAPSULE ORAL at 18:03

## 2020-09-21 RX ADMIN — SODIUM CHLORIDE, SODIUM LACTATE, POTASSIUM CHLORIDE, AND CALCIUM CHLORIDE 125 ML/HR: .6; .31; .03; .02 INJECTION, SOLUTION INTRAVENOUS at 05:09

## 2020-09-21 RX ADMIN — PROMETHAZINE HYDROCHLORIDE 12.5 MG: 25 INJECTION INTRAMUSCULAR; INTRAVENOUS at 00:22

## 2020-09-21 RX ADMIN — BENZOCAINE AND LEVOMENTHOL: 200; 5 SPRAY TOPICAL at 12:27

## 2020-09-21 RX ADMIN — ROPIVACAINE HYDROCHLORIDE: 2 INJECTION, SOLUTION EPIDURAL; INFILTRATION at 02:07

## 2020-09-21 RX ADMIN — ROPIVACAINE HYDROCHLORIDE 4 ML: 2 INJECTION, SOLUTION EPIDURAL; INFILTRATION at 01:43

## 2020-09-21 RX ADMIN — IBUPROFEN 600 MG: 600 TABLET, FILM COATED ORAL at 14:33

## 2020-09-21 RX ADMIN — BUTORPHANOL TARTRATE 1 MG: 1 INJECTION, SOLUTION INTRAMUSCULAR; INTRAVENOUS at 00:22

## 2020-09-21 RX ADMIN — ROPIVACAINE HYDROCHLORIDE 4 ML: 2 INJECTION, SOLUTION EPIDURAL; INFILTRATION at 01:40

## 2020-09-21 NOTE — OB LABOR/OXYTOCIN SAFETY PROGRESS
Labor Progress Note - Hieu Zuniga 22 y o  female MRN: [id number]    Unit/Bed#: -01 Encounter: [id number]    Dose (ayesha-units/min) Oxytocin: 8 ayesha-units/min  Contraction Frequency (minutes): 1 5-2 5  Contraction Quality: Moderate  Tachysystole: No   Cervical Dilation: 7        Cervical Effacement: 90  Fetal Station: -1  Baseline Rate: 125 bpm  Fetal Heart Rate: 130 BPM  FHR Category: Category I               Vital Signs:   Vitals:    09/20/20 2200   BP: 121/70   Pulse: 89   Resp:    Temp:    SpO2: 92%           Notes/comments: SVE as noted above  Patient becoming more uncomfortable  She received stadol and phenergan x1  Pit at 8 shes ctx every 1 5-2 5  FHT category 1  Will continue to monitor          Darlyn Singh MD 9/20/2020 10:29 PM

## 2020-09-21 NOTE — DISCHARGE SUMMARY
Discharge Summary - Kalin Dietz 22 y o  female MRN: [id number]    Unit/Bed#: -01 Encounter: [id number]    Admission Date: 2020     Discharge Date: 20    Admitting Diagnosis:   Patient Active Problem List   Diagnosis    39 weeks gestation of pregnancy    Obesity in pregnancy, antepartum    Nausea and vomiting in pregnancy    Contraceptive education    Fetal ultrasound    Genetic screening    Heart burn     Discharge Diagnosis:   Same, delivered    Procedures:   spontaneous vaginal delivery  nexplanon placement    Admitting Attending: Dr Juany Leach MD  Delivery Attending: Dr Frankie Trent  Discharge Attending: Dr Sandhya Rodriguez Course:     Kalin Dietz is a 22 y o  Dieudonne Martyr who was admitted for elective induction of labor  She received a ayon balloon and cytotec for cervical ripening, pitocin for induction, and an epidural for analgesia  She progressed well  She then underwent an uncomplicated spontaneous vaginal delivery and delivered a viable female  at 18 on 20  APGARS were 9, 9 at 1 and 5 minutes, respectively   weighed 7lb 2 1oz   was then transferred to  nursery  Patient tolerated the procedure well and was transferred to postpartum in stable condition  The patient's post partum course was unremarkable  She received a postpartum nexplanon for contraception prior to discharge  On day of discharge, she was ambulating and able to reasonably perform all ADLs  She was voiding and had appropriate bowel function  Pain was well controlled  She was discharged home on postpartum day #2 without complications  Patient was instructed to follow up with her OB as an outpatient and was given appropriate warnings to call provider if she develops signs of infection or uncontrolled pain      Condition at discharge:   stable     Disposition:   Home    Planned Readmission:   No    Discharge Medications:   Please see after visit summary for full list of discharge medications  Discharge instructions :   -Do not place anything (no partner, tampons or douche) in your vagina for 6 weeks  -You may walk for exercise for the first 6 weeks then gradually return to your usual activities    -Please do not drive for 1 week if you have no stitches and for 2 weeks if you have stitches or underwent a  delivery     -You may take baths or shower per your preference    -Please look at your bust (breasts) in the mirror daily and call provider for redness or tenderness or increased warmth  - If you have had a  please look at your incision daily as well and call provider for increasing redness or steady drainage from the incision    -Please call your provider if temperature > 100 4*F or 38* C, worsening pain or a foul discharge

## 2020-09-21 NOTE — OB LABOR/OXYTOCIN SAFETY PROGRESS
Labor Progress Note - Elif Kilgore 22 y o  female MRN: [id number]    Unit/Bed#: -01 Encounter: [id number]    Dose (ayesha-units/min) Oxytocin: 6 ayesha-units/min  Contraction Frequency (minutes): 2-4  Contraction Quality: Moderate  Tachysystole: No   Cervical Dilation: 9        Cervical Effacement: 100  Fetal Station: 1  Baseline Rate: 135 bpm  Fetal Heart Rate: 130 BPM  FHR Category: Category I               Vital Signs:   Vitals:    09/21/20 0250   BP: 94/55   Pulse:    Resp:    Temp:    SpO2:            Notes/comments: SVE as noted above  Patient comfortable with her epidural  Pelaez catheter was placed  Pit at 6, ctx every 2-4 minutes  FHT category 1  Will reassess in 2 hours or sooner if patient feeling pressure          Jones Steven MD 9/21/2020 4:00 AM

## 2020-09-21 NOTE — DISCHARGE INSTRUCTIONS
Self Care After Delivery   AMBULATORY CARE:   The postpartum period  is the period of time from delivery to about 6 weeks  During this time you may experience many physical and emotional changes  It is important to understand what is normal and when you need to call your healthcare provider  It is also important to know how to care for yourself during this time  Call 911 for any of the following:   · You soak through 1 pad in 15 minutes, have blurry vision, clammy or pale skin, and feel faint  · You faint or lose consciousness  · Your heart is beating faster than normal      · You have trouble breathing  · You cough up blood  Seek care immediately if:   · You soak through 1 or more pads in an hour, or pass blood clots larger than a quarter from your vagina  · Your leg is painful, red, and larger than normal      · You have severe abdominal pain  · You have a bad headache or changes in your vision  · Your episiotomy or C section incision is red, swollen, bleeding, or draining pus  · Your incision comes apart  · You see or hear things that are not there, or have thoughts of harming yourself or your baby  Contact your obstetrician or midwife if:   · You have a fever  · You have new or worsening pain in your abdomen or vagina  · You continue to have the baby blues 10 days after you deliver  · You have trouble sleeping  · You have foul-smelling discharge from your vagina  · You have pain or burning when you urinate  · You do not have a bowel movement for 3 days or more  · You have nausea or are vomiting  · You have hard lumps or red streaks over your breasts  · You have cracked nipples or bleed from your nipples  · You have questions or concerns about your condition or care  Physical changes:   The following are normal changes after you give birth:  · Pain in the area between your anus and vagina    · Breast pain    · Constipation or hemorrhoids    · Hot or cold flashes    · Vaginal bleeding or discharge    · Mild to moderate abdominal cramping    · Difficulty controlling bowel movements or urine  Emotional changes: The following are symptoms of the baby blues, or normal emotions after you give birth  The changes in your emotions may be caused by a drop in hormone levels after you deliver  If these symptoms last longer than 1 to 2 weeks after you give birth, you may have postpartum depression  · Feeling irritable    · Feeling sad    · Crying for no reason    · Feeling anxious  Breast care for nursing mothers: You may have sore breasts for 3 to 6 days after you give birth  This happens as your milk begins to fill your breasts  You may also have sore breasts if you do not breastfeed frequently  Do the following to care for your breasts:  · Apply a moist, warm, compress to your breast as directed  This may help soothe your breasts  Make sure the washcloth is not too hot before you apply it to your breast      · Nurse your baby or pump your milk frequently  This may prevent clogged milk ducts  Ask your healthcare provider how often to nurse or pump  · Massage your breasts as directed  This may help increase your milk flow  Gently rub your breasts in a circular motion before you breastfeed  You may need to gently squeeze your breast or nipple to help release milk  You can also use a breast pump to help release milk from your breast      · Wash your breasts with warm water only  Do not put soap on your nipples  Soap may cause your nipples to become dry  · Apply lanolin cream to your nipples as directed  Lanolin cream may add moisture to your skin and prevent nipple dryness  Always  wash off lanolin cream with warm water before you breastfeed  · Place pads in your bra  Your nipples may leak milk when you are not breastfeeding  You can place pads inside of your bra to help prevent leaking onto your clothing   Ask your healthcare provider where to purchase bra pads  · Get breastfeeding support if needed  There are healthcare providers who can answer questions about breastfeeding and provide you with support  Ask your healthcare provider who you can contact if you need breastfeeding support  Breast care for non-nursing mothers:  Milk will fill your breasts even if you bottle feed your baby  Do the following to help stop your milk from filling your breasts and causing pain:  · Wear a bra with support at all times  A sports bra or a tight-fitting bra will help stop your milk from coming in  · Apply ice on each breast for 15 to 20 minutes every hour or as directed  Use an ice pack, or put crushed ice in a plastic bag  Cover it with a towel  Ice helps your milk ducts shrink  · Keep your breasts away from warm water  Warm water will make it easier for milk to fill your breasts  Stand with your breasts away from warm water in the shower  · Limit how much you touch your breasts  This will prevent them from filling with milk  Perineum care: Your perineum is the area between your rectum and vagina  It is normal to have swelling and pain in this area after you give birth  If you had an episiotomy, your healthcare provider may give you special instructions  · Clean your perineum after you use the bathroom  This may prevent infection and help with healing  Use a spray bottle with warm water to clean your perineum  You may also gently spray warm water against your perineum when you urinate  Always wipe front to back  · Take a sitz bath as directed  A sitz bath may help relieve swelling and pain  Fill your bath tub or bucket with water up to your hips and sit in the water  Use cold water for 2 days after you deliver  Then use warm water  Ask your healthcare provider for more information about a sitz bath  · Apply ice packs for the first 24 hours or as directed  Use a plastic glove filled with ice or buy an ice pack   Wrap the ice pack or plastic glove in a small towel or wash cloth  Place the ice pack on your perineum for 20 minutes at a time  · Sit on a donut-shaped pillow  This may relieve pressure on your perineum when you sit  · Use wipes with medicine or take pills as directed  Your healthcare provider may tell you to use witch hazel pads  You can place witch hazel pads in the refrigerator before you apply them to your perineum  He may also tell you to take NSAIDs  Ask your healthcare provider how often to take pills or use wipes with medicine  · Do not go swimming or take tub baths for 4 to 6 weeks or as directed  This will help prevent an infection in your vagina or uterus  Bowel and bladder care: It may take 3 to 5 days to have a bowel movement after you deliver your baby  You can do the following to prevent or manage constipation, and get control of your bowel or bladder:  · Take stool softeners as directed  A stool softener is medicine that will make your bowel movements softer  This may prevent or relieve constipation  A stool softener may also make bowel movements less painful  · Drink plenty of liquids  Ask how much liquid to drink each day and which liquids are best for you  Liquids may help prevent constipation  · Eat foods high in fiber  Examples include fruits, vegetables, grains, beans, and lentils  Ask your healthcare provider how much fiber you need each day  Fiber may prevent constipation  · Do Kegel exercises as directed  Kegel exercises will help strengthen the muscles that control bowel movements and urination  Ask your healthcare provider for more information on Kegel exercises  · Apply cold compresses or medicine to hemorrhoids as directed  This may relieve swelling and pain  Your healthcare provider may tell you to apply ice or wipes with medicine to your hemorrhoids  He may also tell you to use a sitz bath   Ask your healthcare for more information on how to manage hemorrhoids  Nutrition:  Good nutrition is important in the postpartum period  It will help you return to a healthy weight, increase your energy levels, and prevent constipation  It will also help you get enough nutrients and calories if you are going to breastfeed your baby  · Eat a variety of healthy foods  Healthy foods include fruits, vegetables, whole-grain breads, low-fat dairy products, beans, lean meats, and fish  You may need 500 to 700 additional calories each day if you breastfeed your baby  You may also need extra protein  · Limit foods with added sugar and high amounts of fat  These foods are high in calories and low in healthy nutrients  Read food labels so you know how much sugar and fat is in the food you want to eat  · Drink 8 to 10 glasses of water per day  Water will help you make plenty of milk for your baby  It will also help prevent constipation  Drink a glass of water every time you breastfeed your baby  · Take vitamins as directed  Ask your healthcare provider what vitamins you need  · Limit caffeine and alcohol if you are breastfeeding  Caffeine and alcohol can get into your breast milk  Caffeine and alcohol can make your baby fussy  They can also interfere with your baby's sleep  Ask your healthcare provider if you can drink alcohol or caffeine  Rest and sleep: You may feel very tired in the postpartum period  Enough sleep will help you heal and give you energy to care for your baby  The following may help you get sleep and rest:  · Nap when your baby naps  Your baby may nap several times during the day  Get rest during this time  · Limit visitors  Many people may want to see you and your baby  Ask friends or family to visit on different days  This will give you time to rest      · Do not plan too much for one day  Put off household chores so that you have time to rest  Gradually do more each day  · Ask for help from family, friends, or neighbors    Ask them to help you with laundry, cleaning, or errands  Also ask someone to watch the baby while you take a nap or relax  Ask your partner to help with the care of your baby  Pump some of your breast milk so your partner can feed your baby during the night  Exercise after delivery:  Wait until your healthcare provider says it is okay to exercise  Exercise can help you lose weight, increase your energy levels, and manage your mood  It can also prevent constipation and blood clots  Start with gentle exercises such as walking  Do more as you have more energy  You may need to avoid abdominal exercises for 1 to 2 weeks after you deliver  Talk to your healthcare provider about an exercise plan that is right for you  Sexual activity after delivery:   · Do not have sex until your healthcare provider says it is okay  You may need to wait 4 to 6 weeks before you have sex  This may prevent infection and allow time to heal      · Your menstrual cycle may begin as soon as 3 weeks after you deliver  Your period may be delayed if you breastfeed your baby  You can become pregnant before you get your first postpartum period  Talk to your healthcare provider about birth control that is right for you  Some types of birth control are not safe during breastfeeding  For support and more information:  Join a support group for new mothers  Ask for help from family and friends with chores, errands, and care of your baby  · Office of Women's Health, [place] Department of Health and Human Services  [address], [address] [place] [address]  [address], [address] [address] [place]  [place] , [address]  Phone: 2- 211 - 277-4409  Web Address: www womenshealth gov  · [facility] [address] , [address] [place] [address]  [address]  Web Address: [employer]  org/pregnancy/postpartum-care  aspx  Follow up with your obstetrician or midwife as directed:   You will need to follow up with your healthcare provider in 2 to 6 weeks after delivery  Write down your questions so you remember to ask them at your visits  © 2017 [address] Information is for End User's use only and may not be sold, redistributed or otherwise used for commercial purposes  All illustrations and images included in CareNotes® are the copyrighted property of A D A M , Inc  or Robin Ramirez  The above information is an  only  It is not intended as medical advice for individual conditions or treatments  Talk to your doctor, nurse or pharmacist before following any medical regimen to see if it is safe and effective for you

## 2020-09-21 NOTE — OB LABOR/OXYTOCIN SAFETY PROGRESS
Labor Progress Note - [facility] 22 y o  female MRN: [id number]    Unit/Bed#: -01 Encounter: [id number]    Dose (ayesha-units/min) Oxytocin: 8 ayesha-units/min  Contraction Frequency (minutes): 1 5-2  Contraction Quality: Moderate  Tachysystole: No   Cervical Dilation: 7        Cervical Effacement: 100  Fetal Station: -1  Baseline Rate: 130 bpm  Fetal Heart Rate: 130 BPM  FHR Category: Category I               Vital Signs:   Vitals:    09/20/20 2330   BP: 119/65   Pulse:    Resp:    Temp:    SpO2:            Notes/comments: SVE is unchanged  Patient is in a lot of pain and unable to relax  We discussed using an epidural to help with pain management and relaxation  She is agreeable  FHT category 1  Pit at 8  Ctx every 1 5-2 minutes  Will continue to monitor          Shakeel Arnold MD 9/21/2020 1:18 AM

## 2020-09-21 NOTE — ANESTHESIA PREPROCEDURE EVALUATION
Procedure:  LABOR ANALGESIA    Relevant Problems   No relevant active problems      Lab Results   Component Value Date    WBC 7 68 09/20/2020    HGB 10 7 (L) 09/20/2020     (H) 09/20/2020     No results found for: SODIUM, K, BUN, CREATININE, EGFR, GLUCOSE  No results found for: PTT   No results found for: INR    Blood type A    Lab Results   Component Value Date    HGBA1C 5 6 09/04/2020       Physical Exam    Airway    Mallampati score: II  TM Distance: >3 FB       Dental       Cardiovascular      Pulmonary      Other Findings        Anesthesia Plan  ASA Score- 2     Anesthesia Type- epidural and general with ASA Monitors  Additional Monitors:   Airway Plan:     Comment: Patient seen and examined  Risks/benefits discussed including risk of PDPH, partial or failed block, and rare emergencies including total spinal anesthesia  Anesthetic plan for potential c/s reviewed with patient          Plan Factors-    Chart reviewed  Imaging results reviewed  Existing labs reviewed  Patient summary reviewed  Patient not instructed to abstain from smoking on day of procedure  Patient did not smoke on day of surgery  Induction-     Postoperative Plan-     Informed Consent- Anesthetic plan and risks discussed with patient  I personally reviewed this patient with the CRNA  Discussed and agreed on the Anesthesia Plan with the CRNA  Kami Rodríguez

## 2020-09-21 NOTE — OB LABOR/OXYTOCIN SAFETY PROGRESS
Labor Progress Note - Shasha Barth 22 y o  female MRN: [id number]    Unit/Bed#: -01 Encounter: [id number]    Dose (ayesha-units/min) Oxytocin: 6 ayesha-units/min  Contraction Frequency (minutes): 2-3  Contraction Quality: Moderate  Tachysystole: No   Cervical Dilation: 10  Dilation Complete Date: 09/21/20  Dilation Complete Time: 4214  Cervical Effacement: 100  Fetal Station: 3  Baseline Rate: 130 bpm  Fetal Heart Rate: 130 BPM  FHR Category: Category I               Vital Signs:   Vitals:    09/21/20 0630   BP: 101/55   Pulse: 88   Resp:    Temp:    SpO2:            Notes/comments: SVE as noted above  Patient is comfortable  Will start pushing soon          Lawrence Bueno MD 9/21/2020 6:45 AM

## 2020-09-21 NOTE — SOCIAL WORK
Consult: [facility]: 1/2020; Per review of chart, MOB UDS negative; baby pending;     Delivered today; CM to f/u Tuesday for assessment

## 2020-09-21 NOTE — PLAN OF CARE
Problem: Knowledge Deficit  Goal: Verbalizes understanding of labor plan  Description: Assess patient/family/caregiver's baseline knowledge level and ability to understand information  Provide education via patient/family/caregiver's preferred learning method at appropriate level of understanding  1  Provide teaching at level of understanding  2  Provide teaching via preferred learning method(s)  Outcome: Progressing  Goal: Patient/family/caregiver demonstrates understanding of disease process, treatment plan, medications, and discharge instructions  Description: Complete learning assessment and assess knowledge base    Interventions:  - Provide teaching at level of understanding  - Provide teaching via preferred learning methods  Outcome: Progressing     Problem: POSTPARTUM  Goal: Experiences normal postpartum course  Description: INTERVENTIONS:  - Monitor maternal vital signs  - Assess uterine involution and lochia  Outcome: Progressing  Goal: Appropriate maternal -  bonding  Description: INTERVENTIONS:  - Identify family support  - Assess for appropriate maternal/infant bonding   -Encourage maternal/infant bonding opportunities  - Referral to  or  as needed  Outcome: Progressing  Goal: Establishment of infant feeding pattern  Description: INTERVENTIONS:  - Assess breast/bottle feeding  - Refer to lactation as needed  Outcome: Progressing  Goal: Incision(s), wounds(s) or drain site(s) healing without S/S of infection  Description: INTERVENTIONS  - Assess and document risk factors for skin impairment   - Assess and document dressing, incision, wound bed, drain sites and surrounding tissue  - Consider nutrition services referral as needed  - Oral mucous membranes remain intact  - Provide patient/ family education  Outcome: Progressing     Problem: PAIN - ADULT  Goal: Verbalizes/displays adequate comfort level or baseline comfort level  Description: Interventions:  - Encourage patient to monitor pain and request assistance  - Assess pain using appropriate pain scale  - Administer analgesics based on type and severity of pain and evaluate response  - Implement non-pharmacological measures as appropriate and evaluate response  - Consider cultural and social influences on pain and pain management  - Notify physician/advanced practitioner if interventions unsuccessful or patient reports new pain  Outcome: Progressing     Problem: INFECTION - ADULT  Goal: Absence or prevention of progression during hospitalization  Description: INTERVENTIONS:  - Assess and monitor for signs and symptoms of infection  - Monitor lab/diagnostic results  - Monitor all insertion sites, i e  indwelling lines, tubes, and drains  - Monitor endotracheal if appropriate and nasal secretions for changes in amount and color  - [place] appropriate cooling/warming therapies per order  - Administer medications as ordered  - Instruct and encourage patient and family to use good hand hygiene technique  - Identify and instruct in appropriate isolation precautions for identified infection/condition  Outcome: Progressing  Goal: Absence of fever/infection during neutropenic period  Description: INTERVENTIONS:  - Monitor WBC    Outcome: Progressing     Problem: SAFETY ADULT  Goal: Patient will remain free of falls  Description: INTERVENTIONS:  - Assess patient frequently for physical needs  -  Identify cognitive and physical deficits and behaviors that affect risk of falls    -  [place] fall precautions as indicated by assessment   - Educate patient/family on patient safety including physical limitations  - Instruct patient to call for assistance with activity based on assessment  - Modify environment to reduce risk of injury  - Consider OT/PT consult to assist with strengthening/mobility  Outcome: Progressing  Goal: Maintain or return to baseline ADL function  Description: INTERVENTIONS:  -  Assess patient's ability to carry out ADLs; assess patient's baseline for ADL function and identify physical deficits which impact ability to perform ADLs (bathing, care of mouth/teeth, toileting, grooming, dressing, etc )  - Assess/evaluate cause of self-care deficits   - Assess range of motion  - Assess patient's mobility; develop plan if impaired  - Assess patient's need for assistive devices and provide as appropriate  - Encourage maximum independence but intervene and supervise when necessary  - Involve family in performance of ADLs  - Assess for home care needs following discharge   - Consider OT consult to assist with ADL evaluation and planning for discharge  - Provide patient education as appropriate  Outcome: Progressing  Goal: Maintain or return mobility status to optimal level  Description: INTERVENTIONS:  - Assess patient's baseline mobility status (ambulation, transfers, stairs, etc )    - Identify cognitive and physical deficits and behaviors that affect mobility  - Identify mobility aids required to assist with transfers and/or ambulation (gait belt, sit-to-stand, lift, walker, cane, etc )  - [place] fall precautions as indicated by assessment  - Record patient progress and toleration of activity level on Mobility SBAR; progress patient to next Phase/Stage  - Instruct patient to call for assistance with activity based on assessment  - Consider rehabilitation consult to assist with strengthening/weightbearing, etc   Outcome: Progressing     Problem: DISCHARGE PLANNING  Goal: Discharge to home or other facility with appropriate resources  Description: INTERVENTIONS:  - Identify barriers to discharge w/patient and caregiver  - Arrange for needed discharge resources and transportation as appropriate  - Identify discharge learning needs (meds, wound care, etc )  - Arrange for interpretive services to assist at discharge as needed  - Refer to Case Management Department for coordinating discharge planning if the patient needs post-hospital services based on physician/advanced practitioner order or complex needs related to functional status, cognitive ability, or social support system  Outcome: Progressing     Problem: Labor & Delivery  Goal: Manages discomfort  Description: Assess and monitor for signs and symptoms of discomfort  Assess patient's pain level regularly and per hospital policy  Administer medications as ordered  Support use of nonpharmacological methods to help control pain such as distraction, imagery, relaxation, and application of heat and cold  Collaborate with interdisciplinary team and patient to determine appropriate pain management plan  1  Include patient in decisions related to comfort  2  Offer non-pharmacological pain management interventions  3  Report ineffective pain management to physician  Outcome: Completed  Goal: Patient vital signs are stable  Description: 1  Assess vital signs - vaginal delivery    Outcome: Completed

## 2020-09-21 NOTE — OB LABOR/OXYTOCIN SAFETY PROGRESS
Labor Progress Note - Mini Malagon 22 y o  female MRN: [id number]    Unit/Bed#: -01 Encounter: [id number]    Dose (ayesha-units/min) Oxytocin: 8 ayesha-units/min  Contraction Frequency (minutes): 1 5-2  Contraction Quality: Moderate  Tachysystole: No   Cervical Dilation: 7        Cervical Effacement: 100  Fetal Station: -1  Baseline Rate: 130 bpm  Fetal Heart Rate: 130 BPM  FHR Category: Category I               Vital Signs:   Vitals:    09/20/20 2330   BP: 119/65   Pulse:    Resp:    Temp:    SpO2:            Notes/comments: SVE as noted above  Patient becoming more uncomfortable  Will give her stadol and phenergan again  She does not want an epidural  Pit at 8  Ctx every 1 5-2 minutes  FHT category 1  Will continue to monitor          Ady Torres MD 9/21/2020 12:10 AM

## 2020-09-21 NOTE — L&D DELIVERY NOTE
DELIVERY NOTE  Lester Cruz 22 y o  female MRN: [id number]  Unit/Bed#: -01 Encounter: [id number]    Obstetrician:    Dr Means Arrant    Assistant:   Dr Roxanna Sanchez    Pre-Delivery Diagnosis:   Patient Active Problem List   Diagnosis    39 weeks gestation of pregnancy    Obesity in pregnancy, antepartum    Nausea and vomiting in pregnancy    Contraceptive education    Fetal ultrasound    Genetic screening    Heart burn         Post-Delivery Diagnosis:   Same as above - Delivered    Procedure:  Spontaneous vaginal delivery  2nd degree laceration repair    Specimens:   Cord blood obtained   Placenta; normal appearing, eccentric insertion, intact   Arterial and venous blood gases (below)     Gases:  Umbilical Cord Venous Blood Gas:  Results from last 7 days   Lab Units 20  0825   PH COV  7 365   PCO2 COV mm HG 36 9   HCO3 COV mmol/L 20 6   BASE EXC COV mmol/L -4 1*   O2 CT CD VB mL/dL 14 1   O2 HGB, VENOUS CORD % 07 9     Umbilical Cord Arterial Blood Gas:  Results from last 7 days   Lab Units 20  0825   PH COA  7 346   PCO2 COA  40 4   PO2 COA mm HG 23 8   HCO3 COA mmol/L 21 6   BASE EXC COA mmol/L -3 8*   O2 CONTENT CORD ART ml/dl 12 2   O2 HGB, ARTERIAL CORD % 54 2       Quantitative Blood Loss:   182 mL           Complications:    None    Brief Description of Labor Course:  Lester Cruz is a  now  who had initially presented for IOL for IUGR  Description of Delivery:   With the assistance of maternal expulsive efforts and gentle downward traction of the fetal head, the anterior (right) shoulder was delivered without difficulty, followed by the remainder of the infant's body and contralateral arm  Patient then delivered a viable female  at 5564 over perineum with a none and 2nd degree spontaneous perineal laceration  A nuchal cord was not noted  After delivery of the , delayed clamping of the umbilical cord was undertaken for 30 seconds   The  was noted to have good tone and cry spontaneously  There was no apparent injury to the   The cord was then doubly clamped and cut and the  was passed off to  staff for routine care  Umbilical cord blood and umbilical artery and venous gases were collected  Placenta was delivered at  with fundal massage and gentle traction on the cord with active management of the third stage of labor  Placenta delivered intact with a 3-vessel cord  Active management of the third stage of labor was undertaken with IV pitocin at 250milliunits/min  A bimanual exam was performed  Bleeding was noted to be under control   Outcome:  Living  with APGARS 9, 9 at 1 and 5 minutes, respectively   weight pending    Perineal Inspection/Laceration Repair  The vagina, cervix, perineum, and rectum were inspected and there was noted to be a 2nd degree laceration which was repaired with 3-0 vicryl rapide  Under adequate anesthesia, the apex of the vaginal laceration was identified and an anchoring suture was placed 1 cm above the apex  The vaginal mucosa and underlying rectovaginal fascia were closed using a running locked 3-0 Vicryl-CT 1 suture to the hymenal ring  The suture was then brought underneath the hymenal ring  A stitch was then placed through the bulbocavernosus muscle and tied  Continuing with the same suture, the transverse perineal muscles were reapproximated with 2 transverse running sutures  The suture was brought to the posterior apex of the skin laceration and then the skin was reapproximated in a subcuticular fashion to the hymenal ring  Excellent hemostasis was achieved  Conclusion:  Mother and baby are currently recovering nicely in stable condition  Attending Supervision:   Dr Guadalupe Skiff was present for the entire procedure      Abi Bower MD  PGY-1 OB/GYN   2020 8:55 AM

## 2020-09-21 NOTE — OB LABOR/OXYTOCIN SAFETY PROGRESS
Labor Progress Note - Bassam Bradley 22 y o  female MRN: [id number]    Unit/Bed#: -01 Encounter: [id number]    Dose (ayesha-units/min) Oxytocin: 6 ayesha-units/min  Contraction Frequency (minutes): 2-3  Contraction Quality: Moderate  Tachysystole: No   Cervical Dilation: 6-7        Cervical Effacement: 90  Fetal Station: -1  Baseline Rate: 125 bpm  Fetal Heart Rate: 130 BPM  FHR Category: Category I               Vital Signs:   Vitals:    09/20/20 1802   BP: 113/57   Pulse: 100   Resp: 20   Temp: 98 5 °F (36 9 °C)           Notes/comments: SVE as noted above  Pit is at 6, ctx becoming stronger  FHT category 1  Will reassess in 2 hours or sooner if patient feeling more pressure          Al Medrano MD 9/20/2020 8:25 PM

## 2020-09-21 NOTE — ANESTHESIA POSTPROCEDURE EVALUATION
Post-Op Assessment Note    CV Status:  Stable    Pain management: adequate     Mental Status:  Alert and awake   Hydration Status:  Euvolemic   PONV Controlled:  Controlled   Airway Patency:  Patent      Post Op Vitals Reviewed: Yes      Staff: Anesthesiologist   Comments: epidural catheter removed, tip intact        No complications documented      /55 (09/21/20 1000)    Temp      Pulse 100 (09/21/20 1000)   Resp 16 (09/21/20 1000)    SpO2

## 2020-09-21 NOTE — ANESTHESIA PROCEDURE NOTES
Epidural Block    Patient location during procedure: OB  Start time: 9/21/2020 1:38 AM  Reason for block: procedure for pain and at surgeon's request  Staffing  Anesthesiologist: Hilary Vasques MD  Resident/CRNA: Abimael Lerma CRNA  Preanesthetic Checklist  Completed: patient identified, site marked, surgical consent, pre-op evaluation, timeout performed, IV checked, risks and benefits discussed and monitors and equipment checked  Epidural  Patient position: sitting  Prep: ChloraPrep  Patient monitoring: cardiac monitor and frequent blood pressure checks  Approach: midline  Location: lumbar (1-5)  Injection technique: HODA air  Needle  Needle type: Tuohy   Needle gauge: 18 G  Catheter type: side hole  Catheter size: 20 G  Catheter at skin depth: 11 cm  Test dose: negativenegative aspiration for CSF, negative aspiration for heme and no paresthesia on injection  patient tolerated the procedure well with no immediate complications  Additional Notes  Single skin puncture and needle pass;  hoda first attempt 6 cm; cath to 20 cm no PE; final 11 cm at skin; no PE; neg heme/csf and td negative

## 2020-09-22 PROBLEM — Z97.5 NEXPLANON IN PLACE: Status: ACTIVE | Noted: 2020-09-22

## 2020-09-22 PROCEDURE — 0JHF3HZ INSERTION OF CONTRACEPTIVE DEVICE INTO LEFT UPPER ARM SUBCUTANEOUS TISSUE AND FASCIA, PERCUTANEOUS APPROACH: ICD-10-PCS | Performed by: OBSTETRICS & GYNECOLOGY

## 2020-09-22 PROCEDURE — 11981 INSERTION DRUG DLVR IMPLANT: CPT | Performed by: OBSTETRICS & GYNECOLOGY

## 2020-09-22 PROCEDURE — 99024 POSTOP FOLLOW-UP VISIT: CPT | Performed by: OBSTETRICS & GYNECOLOGY

## 2020-09-22 RX ORDER — LIDOCAINE HYDROCHLORIDE 10 MG/ML
4 INJECTION, SOLUTION EPIDURAL; INFILTRATION; INTRACAUDAL; PERINEURAL ONCE
Status: COMPLETED | OUTPATIENT
Start: 2020-09-22 | End: 2020-09-22

## 2020-09-22 RX ADMIN — IBUPROFEN 600 MG: 600 TABLET, FILM COATED ORAL at 02:12

## 2020-09-22 RX ADMIN — LIDOCAINE HYDROCHLORIDE 4 ML: 10 INJECTION, SOLUTION EPIDURAL; INFILTRATION; INTRACAUDAL; PERINEURAL at 15:30

## 2020-09-22 RX ADMIN — ETONOGESTREL 68 MG: 68 IMPLANT SUBCUTANEOUS at 15:29

## 2020-09-22 RX ADMIN — DOCUSATE SODIUM 100 MG: 100 CAPSULE ORAL at 17:41

## 2020-09-22 RX ADMIN — DOCUSATE SODIUM 100 MG: 100 CAPSULE ORAL at 08:11

## 2020-09-22 RX ADMIN — IBUPROFEN 600 MG: 600 TABLET, FILM COATED ORAL at 16:00

## 2020-09-22 NOTE — PROGRESS NOTES
POSTPARTUM NOTE  Nevin Jehovah's witness 22 y o  female MRN: [id number]  Unit/Bed#: -01 Encounter: [id number]    Date: 09/22/20  Procedure: Spontaneous Vaginal Delivery  Postpartum/Postop Day #: 1     SUBJECTIVE:  Pain: yes  Resolution with analgesics  Tolerating Oral Intake: yes   Voiding: yes  Flatus: yes  Bowel Movement: yes  Ambulating: yes  Breastfeeding: yes  Chest Pain: no  Shortness of Breath: no  Leg Pain/Discomfort: no  Lochia: minimal  Other: No concerns  Opted for nexplanon for contraception  Will leave PPD#2       OBJECTIVE:   Vitals: Temp:  [97 8 °F (36 6 °C)-98 9 °F (37 2 °C)] 97 8 °F (36 6 °C)  HR:  [] 86  Resp:  [16-20] 18  BP: ()/(50-69) 104/66  General: No Acute Distress   Cardiovascular: Regular, Rate and Rhythm, no murmur, normal S1/S2   Lungs: Clear to Auscultation Bilaterally, no wheezing, non-labored breathing   Abdomen: Soft, non-distended, non-tender, no rebound, guarding or CVA tenderness   Fundus: Firm & Non-Tender, Fundal Location: at the umbilicus  Lower Extremities: Non-tender, Edema Minimal    LABS / TESTS / MEDICATION:    Recent Results (from the past 72 hour(s))   Type and screen    Collection Time: 09/20/20  9:23 AM   Result Value Ref Range    ABO Grouping A     Rh Factor Positive     Antibody Screen Negative     Specimen Expiration Date 20200923    CBC and differential    Collection Time: 09/20/20  9:23 AM   Result Value Ref Range    WBC 7 68 4 31 - 10 16 Thousand/uL    RBC 4 24 3 81 - 5 12 Million/uL    Hemoglobin 10 7 (L) 11 5 - 15 4 g/dL    Hematocrit 33 8 (L) 34 8 - 46 1 %    MCV 80 (L) 82 - 98 fL    MCH 25 2 (L) 26 8 - 34 3 pg    MCHC 31 7 31 4 - 37 4 g/dL    RDW 13 9 11 6 - 15 1 %    MPV 10 4 8 9 - 12 7 fL    Platelets 221 (H) 786 - 390 Thousands/uL    nRBC 0 /100 WBCs    Neutrophils Relative 65 43 - 75 %    Immat GRANS % 1 0 - 2 %    Lymphocytes Relative 25 14 - 44 %    Monocytes Relative 8 4 - 12 %    Eosinophils Relative 1 0 - 6 %    Basophils Relative 0 0 - 1 % Neutrophils Absolute 4 98 1 85 - 7 62 Thousands/µL    Immature Grans Absolute 0 04 0 00 - 0 20 Thousand/uL    Lymphocytes Absolute 1 91 0 60 - 4 47 Thousands/µL    Monocytes Absolute 0 64 0 17 - 1 22 Thousand/µL    Eosinophils Absolute 0 09 0 00 - 0 61 Thousand/µL    Basophils Absolute 0 02 0 00 - 0 10 Thousands/µL   RPR    Collection Time: 09/20/20  9:23 AM   Result Value Ref Range    RPR Non-Reactive Non-Reactive   Rapid drug screen, urine    Collection Time: 09/20/20  9:23 AM   Result Value Ref Range    Amph/Meth UR Negative Negative    Barbiturate Ur Negative Negative    Benzodiazepine Urine Negative Negative    Cocaine Urine Negative Negative    Methadone Urine Negative Negative    Opiate Urine Negative Negative    PCP Ur Negative Negative    THC Urine Negative Negative    Oxycodone Urine Negative Negative   Blood gas, arterial, cord    Collection Time: 09/21/20  8:25 AM   Result Value Ref Range    pH, Cord Art 7 346 7 230 - 7 430    pCO2, Cord Art 40 4 30 0 - 60 0    pO2, Cord Art 23 8 5 0 - 25 0 mm HG    HCO3, Cord Art 21 6 17 3 - 27 3 mmol/L    Base Exc, Cord Art -3 8 (L) 3 0 - 11 0 mmol/L    O2 Content, Cord Art 12 2 ml/dl    O2 Hgb, Arterial Cord 54 2 %   Blood gas, venous, cord    Collection Time: 09/21/20  8:25 AM   Result Value Ref Range    pH, Cord Michael 7 365 7 190 - 7 490    pCO2, Cord Michael 36 9 27 0 - 43 0 mm HG    pO2, Cord Michael 28 1 15 0 - 45 0 mm HG    HCO3, Cord Michael 20 6 12 2 - 28 6 mmol/L    Base Exc, Cord Michael -4 1 (L) 1 0 - 9 0 mmol/L    O2 Cont, Cord Michael 14 1 mL/dL    O2 HGB,VENOUS CORD 63 9 %       docusate sodium, 100 mg, Oral, BID      acetaminophen, 650 mg, Q6H PRN  benzocaine-menthol-lanolin-aloe, , 4x Daily PRN  bisacodyl, 10 mg, Daily PRN  calcium carbonate, 1,000 mg, TID PRN  diphenhydrAMINE, 25 mg, Q6H PRN  famotidine, 10 mg, BID PRN  hydrocortisone, 1 application, 4x Daily PRN  ibuprofen, 600 mg, Q6H PRN  ondansetron, 4 mg, Q6H PRN  simethicone, 80 mg, Q6H PRN  witch hazel-glycerin, 1 pad, Q2H PRN        ASSESSMENT:   22 y o  Jennifer Win s/p Spontaneous Vaginal Delivery Postpartum day  1  PLAN:  1) Delivery: Continue routine postpartum care, encourage ambulation, advance diet as tolerated  2) FU with OB in 3 weeks  3) Anticipate discharge 9/23       Signature / Title: Kary Santos DO, Family Medicine  Date: 9/22/2020  Time: 6:14 AM

## 2020-09-22 NOTE — SOCIAL WORK
Consult: [facility]; MOB/baby UDS negative with last reported use 1/2020; No THC use reported or positive UDS results noted in chart during prenatal visits  Assessment completed; no additional CM concerns noted  No additional CM concerns with regard to baby's discharge with MOB when medically ready

## 2020-09-22 NOTE — PROCEDURES
Procedure: Nexplanon insertion    The patient is right handed  The left arm was chosen  Consent was explained and signed  The patient was not pregnant; she just delivered a baby  The patient was positioned supine with her arm externally rotated and flexed at the elbow with her hand behind her head  The insertion site was chosen 8-10 cm medial to the medial epicondyle, and 3-5 cm posterior the sulcus between the bicep and tricep in order to avoid the vascular bundle and ulnar nerve  The skin was cleansed with alcohol  4cc 1% lidocaine was infiltrated at the insertion site  The device was removed from its package  The implant was visible within the insertion device  The insertion was performed according to standard procedure: The tip of the  was passed through the skin, the skin was tented up, and the device was advanced until its full length was beneath the skin  The trigger was activated at and the sheath withdrawn  The implant was successfully deployed  There was a small amount of bleeding from the skin edge which was controlled with pressure  The implant was palpable by me, the patient, and the nurse  There was a small amount of ecchymosis overlying the middle of the implant  It was not expanding  A dressing of gauze and Coban was placed over the site  The patient tolerated the procedure well      Lot number: [id number], Exp Nov 11, 2022    Dr Bunny Badillo was present and participated in all key portions of procedure

## 2020-09-22 NOTE — PLAN OF CARE
Problem: POSTPARTUM  Goal: Experiences normal postpartum course  Description: INTERVENTIONS:  - Monitor maternal vital signs  - Assess uterine involution and lochia  Outcome: Progressing  Goal: Appropriate maternal -  bonding  Description: INTERVENTIONS:  - Identify family support  - Assess for appropriate maternal/infant bonding   -Encourage maternal/infant bonding opportunities  - Referral to  or  as needed  Outcome: Progressing  Goal: Establishment of infant feeding pattern  Description: INTERVENTIONS:  - Assess breast/bottle feeding  - Refer to lactation as needed  Outcome: Progressing  Goal: Incision(s), wounds(s) or drain site(s) healing without S/S of infection  Description: INTERVENTIONS  - Assess and document risk factors for skin impairment   - Assess and document dressing, incision, wound bed, drain sites and surrounding tissue  - Consider nutrition services referral as needed  - Oral mucous membranes remain intact  - Provide patient/ family education  Outcome: Progressing     Problem: PAIN - ADULT  Goal: Verbalizes/displays adequate comfort level or baseline comfort level  Description: Interventions:  - Encourage patient to monitor pain and request assistance  - Assess pain using appropriate pain scale  - Administer analgesics based on type and severity of pain and evaluate response  - Implement non-pharmacological measures as appropriate and evaluate response  - Consider cultural and social influences on pain and pain management  - Notify physician/advanced practitioner if interventions unsuccessful or patient reports new pain  Outcome: Progressing     Problem: Knowledge Deficit  Goal: Patient/family/caregiver demonstrates understanding of disease process, treatment plan, medications, and discharge instructions  Description: Complete learning assessment and assess knowledge base    Interventions:  - Provide teaching at level of understanding  - Provide teaching via preferred learning methods  Outcome: Completed       Problem: INFECTION - ADULT  Goal: Absence or prevention of progression during hospitalization  Description: INTERVENTIONS:  - Assess and monitor for signs and symptoms of infection  - Monitor lab/diagnostic results  - Monitor all insertion sites, i e  indwelling lines, tubes, and drains  - Monitor endotracheal if appropriate and nasal secretions for changes in amount and color  - [place] appropriate cooling/warming therapies per order  - Administer medications as ordered  - Instruct and encourage patient and family to use good hand hygiene technique  - Identify and instruct in appropriate isolation precautions for identified infection/condition  Outcome: Progressing  Goal: Absence of fever/infection during neutropenic period  Description: INTERVENTIONS:  - Monitor WBC    Outcome: Progressing     Problem: SAFETY ADULT  Goal: Patient will remain free of falls  Description: INTERVENTIONS:  - Assess patient frequently for physical needs  -  Identify cognitive and physical deficits and behaviors that affect risk of falls    -  [place] fall precautions as indicated by assessment   - Educate patient/family on patient safety including physical limitations  - Instruct patient to call for assistance with activity based on assessment  - Modify environment to reduce risk of injury  - Consider OT/PT consult to assist with strengthening/mobility  Outcome: Progressing  Goal: Maintain or return to baseline ADL function  Description: INTERVENTIONS:  -  Assess patient's ability to carry out ADLs; assess patient's baseline for ADL function and identify physical deficits which impact ability to perform ADLs (bathing, care of mouth/teeth, toileting, grooming, dressing, etc )  - Assess/evaluate cause of self-care deficits   - Assess range of motion  - Assess patient's mobility; develop plan if impaired  - Assess patient's need for assistive devices and provide as appropriate  - Encourage maximum independence but intervene and supervise when necessary  - Involve family in performance of ADLs  - Assess for home care needs following discharge   - Consider OT consult to assist with ADL evaluation and planning for discharge  - Provide patient education as appropriate  Outcome: Progressing  Goal: Maintain or return mobility status to optimal level  Description: INTERVENTIONS:  - Assess patient's baseline mobility status (ambulation, transfers, stairs, etc )    - Identify cognitive and physical deficits and behaviors that affect mobility  - Identify mobility aids required to assist with transfers and/or ambulation (gait belt, sit-to-stand, lift, walker, cane, etc )  - [place] fall precautions as indicated by assessment  - Record patient progress and toleration of activity level on Mobility SBAR; progress patient to next Phase/Stage  - Instruct patient to call for assistance with activity based on assessment  - Consider rehabilitation consult to assist with strengthening/weightbearing, etc   Outcome: Progressing     Problem: DISCHARGE PLANNING  Goal: Discharge to home or other facility with appropriate resources  Description: INTERVENTIONS:  - Identify barriers to discharge w/patient and caregiver  - Arrange for needed discharge resources and transportation as appropriate  - Identify discharge learning needs (meds, wound care, etc )  - Arrange for interpretive services to assist at discharge as needed  - Refer to Case Management Department for coordinating discharge planning if the patient needs post-hospital services based on physician/advanced practitioner order or complex needs related to functional status, cognitive ability, or social support system  Outcome: Progressing

## 2020-09-22 NOTE — LACTATION NOTE
This note was copied from a baby's chart  CONSULT - LACTATION  Baby Girl Shani Garcia 1 days female MRN: [id number]    [facility] NURSERY Room / Bed: (N)/(N) Encounter: [id number]    Maternal Information     MOTHER:  Janey Garcia  Maternal Age: 22 y o    OB History: # 1 - Date: 20, Sex: Female, Weight: 3235 g (7 lb 2 1 oz), GA: 39w5d, Delivery: Vaginal, Spontaneous, Apgar1: 9, Apgar5: 9, Living: Living, Birth Comments: None   Previouse breast reduction surgery? No    Lactation history:   Has patient previously breast fed: No   How long had patient previously breast fed:     Previous breast feeding complications:       Past Surgical History:   Procedure Laterality Date    FRACTURE SURGERY Right     Yefri Wei and had surgery with plates and 6 screws        Birth information:  YOB: 2022   Time of birth: 8:19 AM   Sex: female   Delivery type: Vaginal, Spontaneous   Birth Weight: 3235 g (7 lb 2 1 oz)   Percent of Weight Change: -1%     Gestational Age: 38w11d   [unfilled]    Assessment     Breast and nipple assessment: normal assessment    [place] Assessment: normal assessment    Feeding assessment: feeding well  LATCH:  Latch: Grasps breast, tongue down, lips flanged, rhythmic sucking   Audible Swallowing: Spontaneous and intermittent (24 hours old)   Type of Nipple: Everted (After stimulation)   Comfort (Breast/Nipple): Soft/non-tender   Hold (Positioning): Partial assist, teach one side, mother does other, staff holds   LATCH Score: 9          Feeding recommendations:  breast feed on demand     Hand expression and latch effective  Randye Scale would like a Spectra S2 for home breast pump  Discussed 2nd night syndrome and ways to calm infant  Hand out given  Information on hand expression given   Discussed benefits of knowing how to manually express breast including stimulating milk supply, softening nipple for latch and evacuating breast in the event of engorgement  Worked on positioning infant up at chest level and starting to feed infant with nose arriving at the nipple  Then, using areolar compression to achieve a deep latch that is comfortable and exchanges optimum amounts of milk  Met with mother  Provided mother with Ready, Set, Baby booklet  Discussed Skin to Skin contact an benefits to mom and baby  Talked about the delay of the first bath until baby has adjusted  Spoke about the benefits of rooming in  Feeding on cue and what that means for recognizing infant's hunger  Avoidance of pacifiers for the first month discussed  Talked about exclusive breastfeeding for the first 6 months  Positioning and latch reviewed as well as showing images of other feeding positions  Discussed the properties of a good latch in any position  Reviewed hand/manual expression  Discussed s/s that baby is getting enough milk and some s/s that breastfeeding dyad may need further help  Gave information on common concerns, what to expect the first few weeks after delivery, preparing for other caregivers, and how partners can help  Resources for support also provided  Encouraged parents to call for assistance, questions, and concerns about breastfeeding  Extension provided    Arsen Ferris RN 9/22/2020 7:36 PM

## 2020-09-22 NOTE — PLAN OF CARE
Problem: Knowledge Deficit  Goal: Verbalizes understanding of labor plan  Description: Assess patient/family/caregiver's baseline knowledge level and ability to understand information  Provide education via patient/family/caregiver's preferred learning method at appropriate level of understanding  1  Provide teaching at level of understanding  2  Provide teaching via preferred learning method(s)    Outcome: Progressing     Problem: POSTPARTUM  Goal: Experiences normal postpartum course  Description: INTERVENTIONS:  - Monitor maternal vital signs  - Assess uterine involution and lochia  Outcome: Progressing  Goal: Appropriate maternal -  bonding  Description: INTERVENTIONS:  - Identify family support  - Assess for appropriate maternal/infant bonding   -Encourage maternal/infant bonding opportunities  - Referral to  or  as needed  Outcome: Progressing  Goal: Establishment of infant feeding pattern  Description: INTERVENTIONS:  - Assess breast/bottle feeding  - Refer to lactation as needed  Outcome: Progressing  Goal: Incision(s), wounds(s) or drain site(s) healing without S/S of infection  Description: INTERVENTIONS  - Assess and document risk factors for skin impairment   - Assess and document dressing, incision, wound bed, drain sites and surrounding tissue  - Consider nutrition services referral as needed  - Oral mucous membranes remain intact  - Provide patient/ family education  Outcome: Progressing     Problem: PAIN - ADULT  Goal: Verbalizes/displays adequate comfort level or baseline comfort level  Description: Interventions:  - Encourage patient to monitor pain and request assistance  - Assess pain using appropriate pain scale  - Administer analgesics based on type and severity of pain and evaluate response  - Implement non-pharmacological measures as appropriate and evaluate response  - Consider cultural and social influences on pain and pain management  - Notify physician/advanced practitioner if interventions unsuccessful or patient reports new pain  Outcome: Progressing     Problem: INFECTION - ADULT  Goal: Absence or prevention of progression during hospitalization  Description: INTERVENTIONS:  - Assess and monitor for signs and symptoms of infection  - Monitor lab/diagnostic results  - Monitor all insertion sites, i e  indwelling lines, tubes, and drains  - Monitor endotracheal if appropriate and nasal secretions for changes in amount and color  - [place] appropriate cooling/warming therapies per order  - Administer medications as ordered  - Instruct and encourage patient and family to use good hand hygiene technique  - Identify and instruct in appropriate isolation precautions for identified infection/condition  Outcome: Progressing  Goal: Absence of fever/infection during neutropenic period  Description: INTERVENTIONS:  - Monitor WBC    Outcome: Progressing     Problem: SAFETY ADULT  Goal: Patient will remain free of falls  Description: INTERVENTIONS:  - Assess patient frequently for physical needs  -  Identify cognitive and physical deficits and behaviors that affect risk of falls    -  [place] fall precautions as indicated by assessment   - Educate patient/family on patient safety including physical limitations  - Instruct patient to call for assistance with activity based on assessment  - Modify environment to reduce risk of injury  - Consider OT/PT consult to assist with strengthening/mobility  Outcome: Progressing  Goal: Maintain or return to baseline ADL function  Description: INTERVENTIONS:  -  Assess patient's ability to carry out ADLs; assess patient's baseline for ADL function and identify physical deficits which impact ability to perform ADLs (bathing, care of mouth/teeth, toileting, grooming, dressing, etc )  - Assess/evaluate cause of self-care deficits   - Assess range of motion  - Assess patient's mobility; develop plan if impaired  - Assess patient's need for assistive devices and provide as appropriate  - Encourage maximum independence but intervene and supervise when necessary  - Involve family in performance of ADLs  - Assess for home care needs following discharge   - Consider OT consult to assist with ADL evaluation and planning for discharge  - Provide patient education as appropriate  Outcome: Progressing  Goal: Maintain or return mobility status to optimal level  Description: INTERVENTIONS:  - Assess patient's baseline mobility status (ambulation, transfers, stairs, etc )    - Identify cognitive and physical deficits and behaviors that affect mobility  - Identify mobility aids required to assist with transfers and/or ambulation (gait belt, sit-to-stand, lift, walker, cane, etc )  - [place] fall precautions as indicated by assessment  - Record patient progress and toleration of activity level on Mobility SBAR; progress patient to next Phase/Stage  - Instruct patient to call for assistance with activity based on assessment  - Consider rehabilitation consult to assist with strengthening/weightbearing, etc   Outcome: Progressing     Problem: DISCHARGE PLANNING  Goal: Discharge to home or other facility with appropriate resources  Description: INTERVENTIONS:  - Identify barriers to discharge w/patient and caregiver  - Arrange for needed discharge resources and transportation as appropriate  - Identify discharge learning needs (meds, wound care, etc )  - Arrange for interpretive services to assist at discharge as needed  - Refer to Case Management Department for coordinating discharge planning if the patient needs post-hospital services based on physician/advanced practitioner order or complex needs related to functional status, cognitive ability, or social support system  Outcome: Progressing

## 2020-09-23 VITALS
BODY MASS INDEX: 38.57 KG/M2 | OXYGEN SATURATION: 96 % | WEIGHT: 240 LBS | TEMPERATURE: 97.9 F | SYSTOLIC BLOOD PRESSURE: 113 MMHG | DIASTOLIC BLOOD PRESSURE: 72 MMHG | HEIGHT: 66 IN | HEART RATE: 88 BPM | RESPIRATION RATE: 18 BRPM

## 2020-09-23 PROCEDURE — 99024 POSTOP FOLLOW-UP VISIT: CPT | Performed by: OBSTETRICS & GYNECOLOGY

## 2020-09-23 RX ORDER — ACETAMINOPHEN 325 MG/1
650 TABLET ORAL EVERY 6 HOURS PRN
Qty: 30 TABLET | Refills: 0
Start: 2020-09-23 | End: 2021-03-02 | Stop reason: ALTCHOICE

## 2020-09-23 RX ORDER — IBUPROFEN 600 MG/1
600 TABLET ORAL EVERY 6 HOURS PRN
Qty: 30 TABLET | Refills: 0
Start: 2020-09-23 | End: 2021-03-02 | Stop reason: ALTCHOICE

## 2020-09-23 RX ORDER — DIAPER,BRIEF,INFANT-TODD,DISP
1 EACH MISCELLANEOUS 4 TIMES DAILY PRN
Qty: 30 G | Refills: 0
Start: 2020-09-23 | End: 2021-03-02 | Stop reason: ALTCHOICE

## 2020-09-23 RX ADMIN — IBUPROFEN 600 MG: 600 TABLET, FILM COATED ORAL at 08:40

## 2020-09-23 RX ADMIN — DOCUSATE SODIUM 100 MG: 100 CAPSULE ORAL at 08:18

## 2020-09-23 NOTE — SOCIAL WORK
Consult: Breast pump; patient would like to have Spectra pump  Freedom of choice given  [facility] referral sent to [facility] for Spectra S2 pump per pt request  No additional needs noted

## 2020-09-23 NOTE — PROGRESS NOTES
POSTPARTUM NOTE  Geraldine Abreu 22 y o  female MRN: [id number]  Unit/Bed#: -01 Encounter: [id number]    Date: 09/23/20  Procedure: Spontaneous Vaginal Delivery  Postpartum/Postop Day #: 2    SUBJECTIVE:  Pain: yes  Resolution with analgesics  Tolerating Oral Intake: yes   Voiding: yes  Flatus: yes  Bowel Movement: yes   Ambulating: yes  Breastfeeding: yes  Chest Pain: no  Shortness of Breath: no  Leg Pain/Discomfort: no  Lochia: minimal  Other: No concerns  Ready to be discharged today        OBJECTIVE:   Vitals: Temp:  [97 8 °F (36 6 °C)-97 9 °F (36 6 °C)] 97 9 °F (36 6 °C)  HR:  [73-94] 73  Resp:  [18-20] 18  BP: ()/(51-68) 110/68  General: No Acute Distress   Cardiovascular: Regular, Rate and Rhythm, no murmur, normal S1/S2   Lungs: Clear to Auscultation Bilaterally, no wheezing, non-labored breathing   Abdomen: Soft, non-distended, non-tender, no rebound, guarding or CVA tenderness   Fundus: Firm & Non-Tender, Fundal Location: -1 cm below the umbilicus  Lower Extremities: Non-tender, Edema Minimal    LABS / TESTS / MEDICATION:    Recent Results (from the past 72 hour(s))   Type and screen    Collection Time: 09/20/20  9:23 AM   Result Value Ref Range    ABO Grouping A     Rh Factor Positive     Antibody Screen Negative     Specimen Expiration Date 20200923    CBC and differential    Collection Time: 09/20/20  9:23 AM   Result Value Ref Range    WBC 7 68 4 31 - 10 16 Thousand/uL    RBC 4 24 3 81 - 5 12 Million/uL    Hemoglobin 10 7 (L) 11 5 - 15 4 g/dL    Hematocrit 33 8 (L) 34 8 - 46 1 %    MCV 80 (L) 82 - 98 fL    MCH 25 2 (L) 26 8 - 34 3 pg    MCHC 31 7 31 4 - 37 4 g/dL    RDW 13 9 11 6 - 15 1 %    MPV 10 4 8 9 - 12 7 fL    Platelets 903 (H) 827 - 390 Thousands/uL    nRBC 0 /100 WBCs    Neutrophils Relative 65 43 - 75 %    Immat GRANS % 1 0 - 2 %    Lymphocytes Relative 25 14 - 44 %    Monocytes Relative 8 4 - 12 %    Eosinophils Relative 1 0 - 6 %    Basophils Relative 0 0 - 1 %    Neutrophils Absolute 4 98 1 85 - 7 62 Thousands/µL    Immature Grans Absolute 0 04 0 00 - 0 20 Thousand/uL    Lymphocytes Absolute 1 91 0 60 - 4 47 Thousands/µL    Monocytes Absolute 0 64 0 17 - 1 22 Thousand/µL    Eosinophils Absolute 0 09 0 00 - 0 61 Thousand/µL    Basophils Absolute 0 02 0 00 - 0 10 Thousands/µL   RPR    Collection Time: 09/20/20  9:23 AM   Result Value Ref Range    RPR Non-Reactive Non-Reactive   Rapid drug screen, urine    Collection Time: 09/20/20  9:23 AM   Result Value Ref Range    Amph/Meth UR Negative Negative    Barbiturate Ur Negative Negative    Benzodiazepine Urine Negative Negative    Cocaine Urine Negative Negative    Methadone Urine Negative Negative    Opiate Urine Negative Negative    PCP Ur Negative Negative    THC Urine Negative Negative    Oxycodone Urine Negative Negative   Blood gas, arterial, cord    Collection Time: 09/21/20  8:25 AM   Result Value Ref Range    pH, Cord Art 7 346 7 230 - 7 430    pCO2, Cord Art 40 4 30 0 - 60 0    pO2, Cord Art 23 8 5 0 - 25 0 mm HG    HCO3, Cord Art 21 6 17 3 - 27 3 mmol/L    Base Exc, Cord Art -3 8 (L) 3 0 - 11 0 mmol/L    O2 Content, Cord Art 12 2 ml/dl    O2 Hgb, Arterial Cord 54 2 %   Blood gas, venous, cord    Collection Time: 09/21/20  8:25 AM   Result Value Ref Range    pH, Cord [place] 7 365 7 190 - 7 490    pCO2, Cord [place] 36 9 27 0 - 43 0 mm HG    pO2, Cord [place] 28 1 15 0 - 45 0 mm HG    HCO3, Cord [place] 20 6 12 2 - 28 6 mmol/L    Base Exc, Cord [place] -4 1 (L) 1 0 - 9 0 mmol/L    O2 Cont, Cord Michael 14 1 mL/dL    O2 HGB,VENOUS CORD 63 9 %       docusate sodium, 100 mg, Oral, BID      acetaminophen, 650 mg, Q6H PRN  benzocaine-menthol-lanolin-aloe, , 4x Daily PRN  bisacodyl, 10 mg, Daily PRN  calcium carbonate, 1,000 mg, TID PRN  diphenhydrAMINE, 25 mg, Q6H PRN  famotidine, 10 mg, BID PRN  hydrocortisone, 1 application, 4x Daily PRN  ibuprofen, 600 mg, Q6H PRN  ondansetron, 4 mg, Q6H PRN  simethicone, 80 mg, Q6H PRN  witch hazel-glycerin, 1 pad, Q2H PRN        ASSESSMENT:   22 y o  Nick Merl s/p Spontaneous Vaginal Delivery Postpartum day  2  PLAN:  1) Delivery: Continue routine postpartum care, encourage ambulation, advance diet as tolerated  2) FU with OB in 3 weeks  3) Nexplanon placed inpatient for contraception        Signature / Title: Love Gallego DO, Family Medicine  Date: 9/23/2020  Time: 5:36 AM

## 2020-09-24 NOTE — UTILIZATION REVIEW
Notification of Maternity/Delivery & [place] Birth Information for Admission   Notification of Maternity/Delivery for Admission to our facility [facility]  Be advised that this patient was admitted to our facility under Inpatient Status  Contact Suresh Torresammy at [phone] for additional admission information  [facility] PARENT/CHILD HEALTH UR DEPT DEDICATED [facility] [phone]  Mother & [place] Information   Patient Name: Rocío Freire   YOB: 2000   Delivering clinician: Constance Felix   OB History        1    Para   1    Term   1       0    AB   0    Living   1       SAB   0    TAB   0    Ectopic   0    Multiple   0    Live Births   1                Name & MRN:   Information for the patient's :  Cristal Enriquez Girl Duannlupe Gavel) [[id number]]     [place] Delivery Information:  Sex: female  Delivered 2020 8:19 AM by Vaginal, Spontaneous; Gestational Age: 38w11d    [place] Measurements:  Weight: 7 lb 2 1 oz (3235 g); Height: 19 5"    APGAR 1 minute 5 minutes 10 minutes   Totals: 9 9       Birth Information: 22 y o  female MRN: [id number] Unit/Bed#: -01 Estimated Date of Delivery: 20  Birthweight: No birth weight on file  Gestational Age: <None> Delivery Type: Vaginal, Spontaneous      Authorization Information   Servicing Facility:   [place] [address]  Tax ID: [id number]  NPI: [id number] Attending Provider/NPI:  Phone:  Address: Xiomara Garza [[id number]]  [phone]  Same as May Olivarez 1106 of Service Code:  24 Place of Service Name: [address] [place] [address]   Start Date: 20 8323   Discharge Date & Time: 2020  1:38 PM    Type of Admission: Inpatient Status Discharge Disposition (if discharged): Home/Self Care   Patient Diagnoses:   Encounter for full-term uncomplicated delivery [A68]  The primary encounter diagnosis was  (spontaneous vaginal delivery)   Diagnoses of 39 weeks gestation of pregnancy and Lactating mother were also pertinent to this visit  1   (spontaneous vaginal delivery)    2  39 weeks gestation of pregnancy    3  Lactating mother       Orders: Admission Orders (From admission, onward)     Ordered        20 0724  Inpatient Admission  Once                    Assigned Utilization Review Contact: Jeremiah Scruggs  Utilization   [facility] Utilization Review Department  Phone: [phone]; Fax [phone]  Email: [email or web address]

## 2020-09-28 LAB — PLACENTA IN STORAGE: NORMAL

## 2020-10-12 ENCOUNTER — POSTPARTUM VISIT (OUTPATIENT)
Dept: OBGYN CLINIC | Facility: CLINIC | Age: 20
End: 2020-10-12

## 2020-10-12 VITALS
RESPIRATION RATE: 16 BRPM | WEIGHT: 226 LBS | TEMPERATURE: 98.2 F | DIASTOLIC BLOOD PRESSURE: 72 MMHG | SYSTOLIC BLOOD PRESSURE: 113 MMHG | HEART RATE: 92 BPM | BODY MASS INDEX: 36.48 KG/M2

## 2020-10-12 PROCEDURE — 99024 POSTOP FOLLOW-UP VISIT: CPT | Performed by: NURSE PRACTITIONER

## 2021-03-01 NOTE — PROGRESS NOTES
Assessment/Plan:    No problem-specific Assessment & Plan notes found for this encounter  {Assess/PlanSmartLinks:14796}      Subjective:      Patient ID: Meghan Slater is a 23 y o  female  HPI 22 yo  that delivered on 2020    {Common ambulatory SmartLinks:47323}    Review of Systems      Objective: There were no vitals taken for this visit           Physical Exam

## 2021-03-02 ENCOUNTER — OFFICE VISIT (OUTPATIENT)
Dept: OBGYN CLINIC | Facility: CLINIC | Age: 21
End: 2021-03-02

## 2021-03-02 VITALS
DIASTOLIC BLOOD PRESSURE: 74 MMHG | SYSTOLIC BLOOD PRESSURE: 114 MMHG | BODY MASS INDEX: 38.57 KG/M2 | WEIGHT: 240 LBS | HEART RATE: 106 BPM | HEIGHT: 66 IN

## 2021-03-02 DIAGNOSIS — F33.1 MODERATE EPISODE OF RECURRENT MAJOR DEPRESSIVE DISORDER (HCC): Primary | ICD-10-CM

## 2021-03-02 PROCEDURE — 99213 OFFICE O/P EST LOW 20 MIN: CPT | Performed by: FAMILY MEDICINE

## 2021-03-02 NOTE — ASSESSMENT & PLAN NOTE
Major depressive episode with contributing stress from child-rearing as single mother  Sxs for > 3 mo  Feelings of sadness, guilt, decreased interest, low energy  No thoughts of harming self or others  Enjoys baby girl  Feels overwhelmed at work  Lives with father  Is single mother without much support from anyone  Plan:   - social work referral placed  - information for Family Medicine clinic provided as pt does not have PCP  She states she will call tomorrow to schedule appointment  - agrees to call office or go to ED if has thoughts of harming self or others

## 2021-03-02 NOTE — PROGRESS NOTES
[facility] Office Visit  Avani Hernández 23 y o  female   MRN: [id number] : 2000  ENCOUNTER: 3/2/2021 5:32 PM    Assessment & Plan     Problem List Items Addressed This Visit        Other    Moderate episode of recurrent major depressive disorder ([facility] [address] ) - Primary     Major depressive episode with contributing stress from child-rearing as single mother  Sxs for > 3 mo  Feelings of sadness, guilt, decreased interest, low energy  No thoughts of harming self or others  Enjoys baby girl  Feels overwhelmed at work  Lives with father  Is single mother without much support from anyone  Plan:   - social work referral placed  - information for Family Medicine clinic provided as pt does not have PCP  She states she will call tomorrow to schedule appointment  - agrees to call office or go to ED if has thoughts of harming self or others  Relevant Orders    Ambulatory referral to social work care management program            Subjective     Chief Complaint     Chief Complaint   Patient presents with    Depression     post partum        History of Present Illness   Avani Hernández is a 23y o -year-old female who presents today for feelings of sadness   Sxs for > 3 months   Has sadness,  guilt, decreased interest, low energy   No thoughts of harming self or others   Enjoys baby girl   Feels overwhelmed at work  Lives with father  Is single mother without much support from anyone  Review of Systems   Constitutional: Negative for activity change, appetite change and fatigue  Cardiovascular: Negative for palpitations  Gastrointestinal: Negative for constipation, diarrhea, nausea and vomiting  Endocrine: Negative for cold intolerance and heat intolerance  Neurological: Negative for dizziness, weakness, light-headedness and headaches  Psychiatric/Behavioral: Positive for decreased concentration and dysphoric mood   Negative for self-injury, sleep disturbance and suicidal ideas  The patient is not nervous/anxious  Feels restless       Patient Active Problem List   Diagnosis    39 weeks gestation of pregnancy    Obesity in pregnancy, antepartum    Nausea and vomiting in pregnancy    Contraceptive education    Fetal ultrasound    Genetic screening    Heart burn     (spontaneous vaginal delivery)    Nexplanon in place    Postpartum follow-up    Moderate episode of recurrent major depressive disorder ([address] )       Past Medical History, Past Surgical History, Family History, and Social History were reviewed and updated today as appropriate  Objective   /74 (BP Location: Left arm, Patient Position: Sitting, Cuff Size: Adult)   Pulse (!) 106   Ht 5' 6" (1 676 m)   Wt 109 kg (240 lb)   BMI 38 74 kg/m²     Physical Exam  Constitutional:       General: She is not in acute distress  Appearance: She is not ill-appearing, toxic-appearing or diaphoretic  Eyes:      Extraocular Movements: Extraocular movements intact  Neck:      Musculoskeletal: Neck supple  Thyroid: No thyroid mass, thyromegaly or thyroid tenderness  Cardiovascular:      Rate and Rhythm: Normal rate and regular rhythm  Pulses: Normal pulses  Heart sounds: Normal heart sounds  Pulmonary:      Effort: Pulmonary effort is normal       Breath sounds: Normal breath sounds  Skin:     General: Skin is warm  Capillary Refill: Capillary refill takes less than 2 seconds  Neurological:      Mental Status: She is alert  Psychiatric:         Thought Content: Thought content normal       Comments: Tearful, flat affect           Pertinent Laboratory/Diagnostic Studies:      Lab Results   Component Value Date    WBC 7 68 2020    HGB 10 7 (L) 2020    HCT 33 8 (L) 2020    MCV 80 (L) 2020     (H) 2020       Lab Results   Component Value Date    HGBA1C 5 6 2020       Results for orders placed or performed during the hospital encounter of 09/20/20   Strep B DNA probe, amplification    Specimen: Vaginal/Rectal; Genital   Result Value Ref Range    Strep Group B Ag Negative Negative, Unknown, None, Pending   CBC and differential   Result Value Ref Range    WBC 7 68 4 31 - 10 16 Thousand/uL    RBC 4 24 3 81 - 5 12 Million/uL    Hemoglobin 10 7 (L) 11 5 - 15 4 g/dL    Hematocrit 33 8 (L) 34 8 - 46 1 %    MCV 80 (L) 82 - 98 fL    MCH 25 2 (L) 26 8 - 34 3 pg    MCHC 31 7 31 4 - 37 4 g/dL    RDW 13 9 11 6 - 15 1 %    MPV 10 4 8 9 - 12 7 fL    Platelets 218 (H) 964 - 390 Thousands/uL    nRBC 0 /100 WBCs    Neutrophils Relative 65 43 - 75 %    Immat GRANS % 1 0 - 2 %    Lymphocytes Relative 25 14 - 44 %    Monocytes Relative 8 4 - 12 %    Eosinophils Relative 1 0 - 6 %    Basophils Relative 0 0 - 1 %    Neutrophils Absolute 4 98 1 85 - 7 62 Thousands/µL    Immature Grans Absolute 0 04 0 00 - 0 20 Thousand/uL    Lymphocytes Absolute 1 91 0 60 - 4 47 Thousands/µL    Monocytes Absolute 0 64 0 17 - 1 22 Thousand/µL    Eosinophils Absolute 0 09 0 00 - 0 61 Thousand/µL    Basophils Absolute 0 02 0 00 - 0 10 Thousands/µL   RPR   Result Value Ref Range    RPR Non-Reactive Non-Reactive   Rapid drug screen, urine   Result Value Ref Range    Amph/Meth UR Negative Negative    Barbiturate Ur Negative Negative    Benzodiazepine Urine Negative Negative    Cocaine Urine Negative Negative    Methadone Urine Negative Negative    Opiate Urine Negative Negative    PCP Ur Negative Negative    THC Urine Negative Negative    Oxycodone Urine Negative Negative   Blood gas, arterial, cord   Result Value Ref Range    pH, Cord Art 7 346 7 230 - 7 430    pCO2, Cord Art 40 4 30 0 - 60 0    pO2, Cord Art 23 8 5 0 - 25 0 mm HG    HCO3, Cord Art 21 6 17 3 - 27 3 mmol/L    Base Exc, Cord Art -3 8 (L) 3 0 - 11 0 mmol/L    O2 Content, Cord Art 12 2 ml/dl    O2 Hgb, Arterial Cord 54 2 %   Blood gas, venous, cord   Result Value Ref Range    pH, Cord Michael 7 365 7 190 - 7 490    pCO2, Cord Michael 36 9 27 0 - 43 0 mm HG    pO2, Cord Michael 28 1 15 0 - 45 0 mm HG    HCO3, Cord Michael 20 6 12 2 - 28 6 mmol/L    Base Exc, Cord Michael -4 1 (L) 1 0 - 9 0 mmol/L    O2 Cont, Cord Michael 14 1 mL/dL    O2 HGB,VENOUS CORD 63 9 %   Placenta in Storage   Result Value Ref Range    PLACENTA IN STORAGE Placenta Discarded    Type and screen   Result Value Ref Range    ABO Grouping A     Rh Factor Positive     Antibody Screen Negative     Specimen Expiration Date [id number]        Orders Placed This Encounter   Procedures    Ambulatory referral to social work care management program       Current Medications     Current Outpatient Medications   Medication Sig Dispense Refill    etonogestrel (NEXPLANON) subdermal implant 68 mg by Subdermal route once      acetaminophen (TYLENOL) 325 mg tablet Take 2 tablets (650 mg total) by mouth every 6 (six) hours as needed for mild pain or headaches (Patient not taking: Reported on 10/12/2020) 30 tablet 0    aspirin 81 mg chewable tablet Chew 2 tablets (162 mg total) daily (Patient not taking: Reported on 10/12/2020) 90 tablet 3    famotidine (PEPCID) 10 mg tablet Take 1 tablet (10 mg total) by mouth 2 (two) times a day as needed for heartburn 60 tablet 0    hydrocortisone 1 % cream Apply 1 application topically 4 (four) times a day as needed (hemorrhoid) (Patient not taking: Reported on 10/12/2020) 30 g 0    ibuprofen (MOTRIN) 600 mg tablet Take 1 tablet (600 mg total) by mouth every 6 (six) hours as needed for moderate pain (Patient not taking: Reported on 10/12/2020) 30 tablet 0    Prenatal Vit-Iron Carbonyl-FA (PRENATAL MULTIVITAMIN) TABS Take 1 tablet by mouth daily      witch hazel-glycerin (TUCKS) topical pad Apply 1 pad topically every 2 (two) hours as needed for hemorrhoids (Patient not taking: Reported on 10/12/2020)  0     No current facility-administered medications for this visit          ALLERGIES:  No Known Allergies    Health Maintenance     Health Maintenance   Topic Date Due    HPV Vaccine (1 - 2-dose series) 11/12/2009    Depression Screening PHQ  11/12/2010    BMI: Followup Plan  11/12/2016    Annual Physical  01/29/2019    Influenza Vaccine (1) 09/01/2020    Chlamydia Screening  03/05/2021    BMI: Adult  03/02/2022    Cervical Cancer Screening  03/05/2023    DTaP,Tdap,and Td Vaccines (2 - Td) 07/31/2030    HIV Screening  Completed    Pneumococcal Vaccine: Pediatrics (0 to 5 Years) and At-Risk Patients (6 to 59 Years)  Aged Out    HIB Vaccine  Aged Out    Hepatitis B Vaccine  Aged Out    IPV Vaccine  Aged Out    Hepatitis A Vaccine  Aged Out    Meningococcal ACWY Vaccine  Aged [employer] History   Administered Date(s) Administered    Tdap 07/31/2020         Edison Cartagena MD   [address]  3/2/2021  5:32 PM    Parts of this note were dictated using [employer] dictation software and may have sounds-like errors due to variation in pronunciation

## 2021-03-03 ENCOUNTER — PATIENT OUTREACH (OUTPATIENT)
Dept: OBGYN CLINIC | Facility: CLINIC | Age: 21
End: 2021-03-03

## 2021-03-11 ENCOUNTER — OFFICE VISIT (OUTPATIENT)
Dept: FAMILY MEDICINE CLINIC | Facility: CLINIC | Age: 21
End: 2021-03-11
Payer: COMMERCIAL

## 2021-03-11 VITALS
OXYGEN SATURATION: 98 % | HEART RATE: 97 BPM | SYSTOLIC BLOOD PRESSURE: 122 MMHG | TEMPERATURE: 97.8 F | BODY MASS INDEX: 38.57 KG/M2 | HEIGHT: 66 IN | WEIGHT: 240 LBS | DIASTOLIC BLOOD PRESSURE: 78 MMHG

## 2021-03-11 DIAGNOSIS — Z13.220 SCREENING FOR LIPID DISORDERS: ICD-10-CM

## 2021-03-11 DIAGNOSIS — Z13.1 SCREENING FOR DIABETES MELLITUS: ICD-10-CM

## 2021-03-11 DIAGNOSIS — D64.9 ANEMIA, UNSPECIFIED TYPE: ICD-10-CM

## 2021-03-11 DIAGNOSIS — F33.1 MODERATE EPISODE OF RECURRENT MAJOR DEPRESSIVE DISORDER (HCC): Primary | ICD-10-CM

## 2021-03-11 PROCEDURE — 99202 OFFICE O/P NEW SF 15 MIN: CPT | Performed by: FAMILY MEDICINE

## 2021-03-11 RX ORDER — ESCITALOPRAM OXALATE 5 MG/1
5 TABLET ORAL DAILY
Qty: 30 TABLET | Refills: 5 | Status: SHIPPED | OUTPATIENT
Start: 2021-03-11 | End: 2021-03-30

## 2021-03-11 NOTE — PROGRESS NOTES
Assessment/Plan: Moderate episode of recurrent major depressive disorder ([facility] [address] )  Worsening symptoms for 3 months, but ongoing depression since high school  No h/o SI/HI, no current SI or HI  Motivation to live is baby girl  Will r/o thyroid disorder as contributing cause of symptoms, and also evaluate for other metabolic derangements  · Started lexapro 5mg daily  · Discussed importance of daily compliance for therapeutic benefit  · Discussed potential side effects including GI distress and sexual side effects  · Provided patient handout which includes suicide hotline - urged her to call our office or visit ED if having any thoughts or plans of suicide  · Will refer to our therapist at the [facility] office  · Follow up in 2 weeks; if subtherapeutic, will increase to 10mg as long as tolerating with no to minimal side effects       Diagnoses and all orders for this visit:    Moderate episode of recurrent major depressive disorder ([facility] )  -     TSH, 3rd generation with Free T4 reflex; Future  -     escitalopram (LEXAPRO) 5 mg tablet; Take 1 tablet (5 mg total) by mouth daily    Anemia, unspecified type  -     CBC and differential; Future    Screening for lipid disorders  -     Lipid Panel with Direct LDL reflex; Future    Screening for diabetes mellitus  -     Comprehensive metabolic panel; Future          Subjective:     Chief Complaint   Patient presents with    Anxiety    Depression          Patient ID: Edna Romero is a 23 y o  female  HPI    Here with her daughter and father today  Wants them in here for this visit  Her nurse (family and nurses program) said she has post-partum depression and anxiety  Saw Dr Ta Pulido last week - he discussed medications and therapy last week  3/2 - PPD edinburgh scale - score was 15  She is interested in medication and therapy  She is not breast feeding or pumping- she is formula fed  She will be 6 months in 1 week     Has never been on medications although has done therapy in the past  Has had depression since high school; now has both depression and anxiety  No SI/HI; h/o self harm with cutting  Anxiety- overwhelming feelings of drowning at random times  Labile emotions- mostly crying easily  Feels a loss of identity  Motivation to live is her daughter  The following portions of the patient's history were reviewed and updated as appropriate: allergies, current medications, past family history, past medical history, past social history, past surgical history and problem list     Review of Systems   Constitutional: Negative for chills, fever and unexpected weight change  Gastrointestinal: Negative for abdominal pain, constipation, diarrhea, nausea and vomiting  Psychiatric/Behavioral: Positive for dysphoric mood and sleep disturbance  Negative for self-injury and suicidal ideas  The patient is nervous/anxious  Objective:      /78   Pulse 97   Temp 97 8 °F (36 6 °C)   Ht 5' 6" (1 676 m)   Wt 109 kg (240 lb)   SpO2 98%   BMI 38 74 kg/m²          Physical Exam  Vitals signs and nursing note reviewed  Constitutional:       General: She is not in acute distress  Appearance: Normal appearance  She is normal weight  She is not ill-appearing, toxic-appearing or diaphoretic  HENT:      Head: Normocephalic and atraumatic  Neck:      Musculoskeletal: Normal range of motion and neck supple  No muscular tenderness  Cardiovascular:      Rate and Rhythm: Normal rate and regular rhythm  Pulses: Normal pulses  Pulmonary:      Effort: Pulmonary effort is normal  No respiratory distress  Breath sounds: Normal breath sounds  No stridor  No wheezing, rhonchi or rales  Musculoskeletal:      Right lower leg: No edema  Left lower leg: No edema  Lymphadenopathy:      Cervical: No cervical adenopathy  Skin:     Capillary Refill: Capillary refill takes less than 2 seconds     Neurological:      Mental Status: She is alert and oriented to person, place, and time     Psychiatric:         Mood and Affect: Mood normal          Behavior: Behavior normal            Signature: Zina David DO, [address], PGY-2  03/11/21

## 2021-03-11 NOTE — PATIENT INSTRUCTIONS
Depression   AMBULATORY CARE:   Depression  is a medical condition that causes feelings of sadness or hopelessness that do not go away  Depression may cause you to lose interest in things you used to enjoy  These feelings may interfere with your daily life  Common symptoms include the following:   · Appetite changes, or weight gain or loss    · Trouble going to sleep or staying asleep, or sleeping too much    · Fatigue or lack of energy    · Feeling restless, irritable, or withdrawn    · Feeling worthless, hopeless, discouraged, or guilty    · Trouble concentrating, remembering things, doing daily tasks, or making decisions    · Thoughts about hurting or killing yourself    Call your local emergency number (911 in the [address] [facility]) if:   · You think about harming yourself or someone else  · You have done something on purpose to hurt yourself  Call your therapist or doctor if:   · Your symptoms do not improve  · You cannot make it to your next appointment  · You have new symptoms  · You have questions or concerns about your condition or care  The following resources are available at any time to help you, if needed:   · [address] [place] [address]: [phone] (7-934-580-LQGR)     · Suicide Hotline: 2-802.273.2675 (3-206-AGIALCI)     · For a list of international numbers: https://save org/find-help/international-resources/    Treatment for depression  may include medicine to relieve depression  Medicine is often used together with therapy  Therapy is a way for you to talk about your feelings and anything that may be causing depression  Therapy can be done alone or in a group  It may also be done with family members or a significant other  Self-care:   · Get regular physical activity  Try to be active for 30 minutes, 3 to 5 days a week  Physical activity can help relieve depression  Work with your healthcare provider to develop a plan that you enjoy   It may help to ask someone to be active with you     · Create a regular sleep schedule  A routine can help you relax before bed  Listen to music, read, or do yoga  Try to go to bed and wake up at the same time every day  Sleep is important for emotional health  · Eat a variety of healthy foods  Healthy foods include fruits, vegetables, whole-grain breads, low-fat dairy products, lean meats, fish, and cooked beans  A healthy meal plan is low in fat, salt, and added sugar  · Do not drink alcohol or use drugs  Alcohol and drugs can make depression worse  Talk to your therapist or doctor if you need help quitting  Follow up with your healthcare provider as directed: Your healthcare provider will monitor your progress at follow-up visits  He or she will also monitor your medicine if you take antidepressants  Your healthcare provider will ask if the medicine is helping  Tell him or her about any side effects or problems you may have with your medicine  The type or amount of medicine may need to be changed  Write down your questions so you remember to ask them during your visits  © Copyright [address] Information is for End User's use only and may not be sold, redistributed or otherwise used for commercial purposes  All illustrations and images included in CareNotes® are the copyrighted property of A D A M , Inc  or [address] [place], [place] [address]elisa   The above information is an  only  It is not intended as medical advice for individual conditions or treatments  Talk to your doctor, nurse or pharmacist before following any medical regimen to see if it is safe and effective for you

## 2021-03-12 ENCOUNTER — APPOINTMENT (OUTPATIENT)
Dept: LAB | Facility: HOSPITAL | Age: 21
End: 2021-03-12
Payer: COMMERCIAL

## 2021-03-12 ENCOUNTER — ANNUAL EXAM (OUTPATIENT)
Dept: OBGYN CLINIC | Facility: CLINIC | Age: 21
End: 2021-03-12

## 2021-03-12 VITALS
BODY MASS INDEX: 39.06 KG/M2 | WEIGHT: 242 LBS | DIASTOLIC BLOOD PRESSURE: 72 MMHG | HEART RATE: 94 BPM | SYSTOLIC BLOOD PRESSURE: 108 MMHG

## 2021-03-12 DIAGNOSIS — Z13.220 SCREENING FOR LIPID DISORDERS: ICD-10-CM

## 2021-03-12 DIAGNOSIS — F33.1 MODERATE EPISODE OF RECURRENT MAJOR DEPRESSIVE DISORDER (HCC): ICD-10-CM

## 2021-03-12 DIAGNOSIS — D64.9 ANEMIA, UNSPECIFIED TYPE: ICD-10-CM

## 2021-03-12 DIAGNOSIS — Z01.419 ENCOUNTER FOR GYNECOLOGICAL EXAMINATION WITHOUT ABNORMAL FINDING: Primary | ICD-10-CM

## 2021-03-12 DIAGNOSIS — Z13.1 SCREENING FOR DIABETES MELLITUS: ICD-10-CM

## 2021-03-12 LAB
ALBUMIN SERPL BCP-MCNC: 3.4 G/DL (ref 3.5–5)
ALP SERPL-CCNC: 62 U/L (ref 46–116)
ALT SERPL W P-5'-P-CCNC: 53 U/L (ref 12–78)
ANION GAP SERPL CALCULATED.3IONS-SCNC: 11 MMOL/L (ref 4–13)
AST SERPL W P-5'-P-CCNC: 23 U/L (ref 5–45)
BASOPHILS # BLD AUTO: 0.01 THOUSANDS/ΜL (ref 0–0.1)
BASOPHILS NFR BLD AUTO: 0 % (ref 0–1)
BILIRUB SERPL-MCNC: 0.38 MG/DL (ref 0.2–1)
BUN SERPL-MCNC: 15 MG/DL (ref 5–25)
CALCIUM ALBUM COR SERPL-MCNC: 8.9 MG/DL (ref 8.3–10.1)
CALCIUM SERPL-MCNC: 8.4 MG/DL (ref 8.3–10.1)
CHLORIDE SERPL-SCNC: 107 MMOL/L (ref 100–108)
CHOLEST SERPL-MCNC: 120 MG/DL (ref 50–200)
CO2 SERPL-SCNC: 24 MMOL/L (ref 21–32)
CREAT SERPL-MCNC: 0.72 MG/DL (ref 0.6–1.3)
EOSINOPHIL # BLD AUTO: 0.1 THOUSAND/ΜL (ref 0–0.61)
EOSINOPHIL NFR BLD AUTO: 2 % (ref 0–6)
ERYTHROCYTE [DISTWIDTH] IN BLOOD BY AUTOMATED COUNT: 13.5 % (ref 11.6–15.1)
GFR SERPL CREATININE-BSD FRML MDRD: 119 ML/MIN/1.73SQ M
GLUCOSE P FAST SERPL-MCNC: 95 MG/DL (ref 65–99)
HCT VFR BLD AUTO: 38.1 % (ref 34.8–46.1)
HDLC SERPL-MCNC: 41 MG/DL
HGB BLD-MCNC: 12.2 G/DL (ref 11.5–15.4)
IMM GRANULOCYTES # BLD AUTO: 0.01 THOUSAND/UL (ref 0–0.2)
IMM GRANULOCYTES NFR BLD AUTO: 0 % (ref 0–2)
LDLC SERPL CALC-MCNC: 70 MG/DL (ref 0–100)
LYMPHOCYTES # BLD AUTO: 2.21 THOUSANDS/ΜL (ref 0.6–4.47)
LYMPHOCYTES NFR BLD AUTO: 48 % (ref 14–44)
MCH RBC QN AUTO: 26.1 PG (ref 26.8–34.3)
MCHC RBC AUTO-ENTMCNC: 32 G/DL (ref 31.4–37.4)
MCV RBC AUTO: 81 FL (ref 82–98)
MONOCYTES # BLD AUTO: 0.42 THOUSAND/ΜL (ref 0.17–1.22)
MONOCYTES NFR BLD AUTO: 9 % (ref 4–12)
NEUTROPHILS # BLD AUTO: 1.89 THOUSANDS/ΜL (ref 1.85–7.62)
NEUTS SEG NFR BLD AUTO: 41 % (ref 43–75)
NRBC BLD AUTO-RTO: 0 /100 WBCS
PLATELET # BLD AUTO: 316 THOUSANDS/UL (ref 149–390)
PMV BLD AUTO: 10.3 FL (ref 8.9–12.7)
POTASSIUM SERPL-SCNC: 4 MMOL/L (ref 3.5–5.3)
PROT SERPL-MCNC: 6.9 G/DL (ref 6.4–8.2)
RBC # BLD AUTO: 4.68 MILLION/UL (ref 3.81–5.12)
SODIUM SERPL-SCNC: 142 MMOL/L (ref 136–145)
TRIGL SERPL-MCNC: 44 MG/DL
TSH SERPL DL<=0.05 MIU/L-ACNC: 1.08 UIU/ML (ref 0.36–3.74)
WBC # BLD AUTO: 4.64 THOUSAND/UL (ref 4.31–10.16)

## 2021-03-12 PROCEDURE — 85025 COMPLETE CBC W/AUTO DIFF WBC: CPT

## 2021-03-12 PROCEDURE — 84443 ASSAY THYROID STIM HORMONE: CPT

## 2021-03-12 PROCEDURE — 80053 COMPREHEN METABOLIC PANEL: CPT

## 2021-03-12 PROCEDURE — 99395 PREV VISIT EST AGE 18-39: CPT | Performed by: OBSTETRICS & GYNECOLOGY

## 2021-03-12 PROCEDURE — 80061 LIPID PANEL: CPT

## 2021-03-12 PROCEDURE — 36415 COLL VENOUS BLD VENIPUNCTURE: CPT

## 2021-03-12 NOTE — PROGRESS NOTES
Subjective      Miachel Mcburney is a 23 y o  female who presents for annual well woman exam  Periods before nexplanon were very regular  With nexplanon, periods irregular, happening every other week  Lasting 3-7 days  No intermenstrual bleeding, spotting, or discharge  Has not been sexually active for >6 months  Current contraception: nexplanon, condom  History of abnormal Pap smear: no  Family history of uterine or ovarian cancer: no  Regular self breast exam: yes  History of abnormal mammogram: n/a  Family history of breast cancer: no  History of abnormal lipids: no - recent lipid panel 3/12/2021- wnl    Menstrual History:  OB History        1    Para   1    Term   1       0    AB   0    Living   1       SAB   0    TAB   0    Ectopic   0    Multiple   0    Live Births   3                Menarche age: 13years old  Patient's last menstrual period was 2021         The following portions of the patient's history were reviewed and updated as appropriate: allergies, current medications, past family history, past medical history, past social history, past surgical history and problem list     Review of Systems  Constitutional: negative, for fevers, chills  Respiratory: negative for cough, dyspnea on exertion and wheezing  Cardiovascular: negative for chest pain and lower extremity edema  Gastrointestinal: negative for abdominal pain, constipation, diarrhea, nausea and vomiting  Genitourinary:negative for frequency, hematuria, hesitancy and urinary incontinence  Behavioral/Psych: positive for anxiety, depression and denies suicidal thought intent or self harm      Objective      Wt 110 kg (242 lb)   LMP 2021   BMI 39 06 kg/m²     General:   alert, appears stated age and cooperative   Heart: regular rate and rhythm, S1, S2 normal, no murmur, click, rub or gallop   Lungs: clear to auscultation bilaterally   Abdomen: soft, non-tender, without masses or organomegaly   Breast: breasts appear normal, no suspicious masses, no skin or nipple changes or axillary nodes  Assessment     Well woman; UTD on pap smear  No indication for mammogram  No need for STI testing  Currently on nexplanon, having irregular bleeding- most likely due to nexplanon since periods were normal prior to this  Plan     PAP done 3/5/2020- normal - no indication for PAP today   All questions answered  Contraception: condoms and nexplanon  - with nexplanon, she would like to wait to see how her bleeding changes over time  Will call and schedule an appointment if becomes intolerable and wants to switch       Signature: Juan James DO, [address], PGY-2  03/12/21

## 2021-03-12 NOTE — ASSESSMENT & PLAN NOTE
Worsening symptoms for 3 months, but ongoing depression since high school  No h/o SI/HI, no current SI or HI  Motivation to live is baby girl   Will r/o thyroid disorder as contributing cause of symptoms, and also evaluate for other metabolic derangements  · Started lexapro 5mg daily  · Discussed importance of daily compliance for therapeutic benefit  · Discussed potential side effects including GI distress and sexual side effects  · Provided patient handout which includes suicide hotline - urged her to call our office or visit ED if having any thoughts or plans of suicide  · Will refer to our therapist at the [facility] office  · Follow up in 2 weeks; if subtherapeutic, will increase to 10mg as long as tolerating with no to minimal side effects

## 2021-03-16 ENCOUNTER — TELEPHONE (OUTPATIENT)
Dept: FAMILY MEDICINE CLINIC | Facility: CLINIC | Age: 21
End: 2021-03-16

## 2021-03-16 NOTE — TELEPHONE ENCOUNTER
Attempted to call patient regarding therapy  Will call again later- sent to voicemail  Will tell her his information: [phone] , located at [address] [place] [address], available on Mondays

## 2021-03-24 ENCOUNTER — PATIENT OUTREACH (OUTPATIENT)
Dept: OBGYN CLINIC | Facility: CLINIC | Age: 21
End: 2021-03-24

## 2021-03-24 NOTE — PROGRESS NOTES
SARAH CHEEK spoke with 22 y/o-S-P1-  English Speaking woman to address depression  Pt reported she is currently under her PCP care for the depression  Pt not interested on out patient mental health services  Pt states she is doing well  Denies question or concern at this time  SARAH CHEEK explained her role and encouraged Pt to call as needed

## 2021-03-30 ENCOUNTER — OFFICE VISIT (OUTPATIENT)
Dept: FAMILY MEDICINE CLINIC | Facility: CLINIC | Age: 21
End: 2021-03-30
Payer: COMMERCIAL

## 2021-03-30 VITALS
WEIGHT: 243 LBS | OXYGEN SATURATION: 100 % | SYSTOLIC BLOOD PRESSURE: 110 MMHG | HEIGHT: 66 IN | TEMPERATURE: 97.8 F | HEART RATE: 78 BPM | DIASTOLIC BLOOD PRESSURE: 72 MMHG | BODY MASS INDEX: 39.05 KG/M2

## 2021-03-30 DIAGNOSIS — F33.1 MODERATE EPISODE OF RECURRENT MAJOR DEPRESSIVE DISORDER (HCC): ICD-10-CM

## 2021-03-30 PROCEDURE — 99213 OFFICE O/P EST LOW 20 MIN: CPT | Performed by: FAMILY MEDICINE

## 2021-03-30 RX ORDER — ESCITALOPRAM OXALATE 5 MG/1
10 TABLET ORAL DAILY
Qty: 30 TABLET | Refills: 5 | Status: SHIPPED | OUTPATIENT
Start: 2021-03-30 | End: 2021-03-31 | Stop reason: DRUGHIGH

## 2021-03-30 NOTE — PROGRESS NOTES
Family Medicine Office Visit  Eleanor Gant 23 y o  female   MRN: [id number] : 2000  ENCOUNTER: 3/30/2021 2:05 PM    Assessment & Plan     Problem List Items Addressed This Visit        Other    Moderate episode of recurrent major depressive disorder ([facility] [address] )     Improvement in sxs with lexapro 5mg, still has 1-2 days per week with symptoms of depression  PHQ-9 score of 4  Coexisting anxiety, not much improved  No thoughts of self harm/harming others  Enjoying her baby who is now 6 months    - No appt yet for behavioral therapy  Plan:   - increase lexapro to 10mg qd  - Referral to behavioral therapy  Asked staff to call and schedule appt at [facility]  - f/u in 3 weeks, reassess depression and anxiety  - ED precautions advised  Call clinic or go to ED if having thoughts of harming self or others  Relevant Medications    escitalopram (LEXAPRO) 5 mg tablet    Other Relevant Orders    Ambulatory referral to behavioral health therapists            Subjective     Chief Complaint     Chief Complaint   Patient presents with    Follow-up     lexapro helping depression, not helping anxiety       History of Present Illness   Eleanor Gant is a 23y o -year-old female who presents today for f/u of MDD   sxs of depression have improved  Now has 1-2 days per week where she feels down but most days she does not   Also has noted anxiety that hasn't improved with lexapro  Review of Systems   Constitutional: Negative for activity change and appetite change  Cardiovascular: Negative for palpitations  Psychiatric/Behavioral: Negative for decreased concentration, dysphoric mood, self-injury, sleep disturbance and suicidal ideas  The patient is nervous/anxious (most days of the week)          Patient Active Problem List   Diagnosis    Obesity in pregnancy, antepartum    Contraceptive education    Heart burn     (spontaneous vaginal delivery)    Nexplanon in place    Postpartum follow-up    Moderate episode of recurrent major depressive disorder [facility])       Past Medical History, Past Surgical History, Family History, and Social History were reviewed and updated today as appropriate  Objective   /72 (BP Location: Left arm, Patient Position: Sitting, Cuff Size: Large)   Pulse 78   Temp 97 8 °F (36 6 °C)   Ht 5' 6" (1 676 m)   Wt 110 kg (243 lb)   LMP 03/07/2021   SpO2 100%   BMI 39 22 kg/m²     Physical Exam  Vitals signs and nursing note reviewed  Constitutional:       General: She is not in acute distress  Appearance: She is not ill-appearing, toxic-appearing or diaphoretic  Eyes:      Extraocular Movements: Extraocular movements intact  Pupils: Pupils are equal, round, and reactive to light  Cardiovascular:      Rate and Rhythm: Normal rate and regular rhythm  Pulses: Normal pulses  Heart sounds: Normal heart sounds  Pulmonary:      Effort: Pulmonary effort is normal       Breath sounds: Normal breath sounds  Skin:     Comments: Not obvious deformities of nails noted  Neurological:      Mental Status: She is alert     Psychiatric:      Comments: Happy affect       Pertinent Laboratory/Diagnostic Studies:  Lab Results   Component Value Date    BUN 15 03/12/2021    CREATININE 0 72 03/12/2021    CALCIUM 8 4 03/12/2021    K 4 0 03/12/2021    CO2 24 03/12/2021     03/12/2021     Lab Results   Component Value Date    ALT 53 03/12/2021    AST 23 03/12/2021    ALKPHOS 62 03/12/2021       Lab Results   Component Value Date    WBC 4 64 03/12/2021    HGB 12 2 03/12/2021    HCT 38 1 03/12/2021    MCV 81 (L) 03/12/2021     03/12/2021       No results found for: TSH    No results found for: CHOL  Lab Results   Component Value Date    TRIG 44 03/12/2021     Lab Results   Component Value Date    HDL 41 03/12/2021     Lab Results   Component Value Date    LDLCALC 70 03/12/2021     Lab Results   Component Value Date    HGBA1C 5 6 09/04/2020 Results for orders placed or performed in visit on 03/12/21   TSH, 3rd generation with Free T4 reflex   Result Value Ref Range    TSH 3RD GENERATON 1 082 0 358 - 3 740 uIU/mL   CBC and differential   Result Value Ref Range    WBC 4 64 4 31 - 10 16 Thousand/uL    RBC 4 68 3 81 - 5 12 Million/uL    Hemoglobin 12 2 11 5 - 15 4 g/dL    Hematocrit 38 1 34 8 - 46 1 %    MCV 81 (L) 82 - 98 fL    MCH 26 1 (L) 26 8 - 34 3 pg    MCHC 32 0 31 4 - 37 4 g/dL    RDW 13 5 11 6 - 15 1 %    MPV 10 3 8 9 - 12 7 fL    Platelets 607 474 - 391 Thousands/uL    nRBC 0 /100 WBCs    Neutrophils Relative 41 (L) 43 - 75 %    Immat GRANS % 0 0 - 2 %    Lymphocytes Relative 48 (H) 14 - 44 %    Monocytes Relative 9 4 - 12 %    Eosinophils Relative 2 0 - 6 %    Basophils Relative 0 0 - 1 %    Neutrophils Absolute 1 89 1 85 - 7 62 Thousands/µL    Immature Grans Absolute 0 01 0 00 - 0 20 Thousand/uL    Lymphocytes Absolute 2 21 0 60 - 4 47 Thousands/µL    Monocytes Absolute 0 42 0 17 - 1 22 Thousand/µL    Eosinophils Absolute 0 10 0 00 - 0 61 Thousand/µL    Basophils Absolute 0 01 0 00 - 0 10 Thousands/µL   Comprehensive metabolic panel   Result Value Ref Range    Sodium 142 136 - 145 mmol/L    Potassium 4 0 3 5 - 5 3 mmol/L    Chloride 107 100 - 108 mmol/L    CO2 24 21 - 32 mmol/L    ANION GAP 11 4 - 13 mmol/L    BUN 15 5 - 25 mg/dL    Creatinine 0 72 0 60 - 1 30 mg/dL    Glucose, Fasting 95 65 - 99 mg/dL    Calcium 8 4 8 3 - 10 1 mg/dL    Corrected Calcium 8 9 8 3 - 10 1 mg/dL    AST 23 5 - 45 U/L    ALT 53 12 - 78 U/L    Alkaline Phosphatase 62 46 - 116 U/L    Total Protein 6 9 6 4 - 8 2 g/dL    Albumin 3 4 (L) 3 5 - 5 0 g/dL    Total Bilirubin 0 38 0 20 - 1 00 mg/dL    eGFR 119 ml/min/1 73sq m   Lipid Panel with Direct LDL reflex   Result Value Ref Range    Cholesterol 120 50 - 200 mg/dL    Triglycerides 44 <=150 mg/dL    HDL, Direct 41 >=40 mg/dL    LDL Calculated 70 0 - 100 mg/dL       Orders Placed This Encounter   Procedures    Ambulatory referral to behavioral health therapists       Imaging:  No results found  Current Medications     Current Outpatient Medications   Medication Sig Dispense Refill    escitalopram (LEXAPRO) 5 mg tablet Take 2 tablets (10 mg total) by mouth daily 30 tablet 5    etonogestrel (NEXPLANON) subdermal implant 68 mg by Subdermal route once      Prenatal Vit-Iron Carbonyl-FA (PRENATAL MULTIVITAMIN) TABS Take 1 tablet by mouth daily      famotidine (PEPCID) 10 mg tablet Take 1 tablet (10 mg total) by mouth 2 (two) times a day as needed for heartburn (Patient not taking: Reported on 3/11/2021) 60 tablet 0     No current facility-administered medications for this visit  ALLERGIES:  No Known Allergies    Health Maintenance     Health Maintenance   Topic Date Due    HPV Vaccine (1 - 2-dose series) Never done    COVID-19 Vaccine (1) Never done    BMI: Followup Plan  Never done    Influenza Vaccine (1) Never done    Chlamydia Screening  03/05/2021    Annual Physical  03/12/2022    Depression Remission PHQ  03/12/2022    BMI: Adult  03/30/2022    Cervical Cancer Screening  03/05/2023    DTaP,Tdap,and Td Vaccines (2 - Td) 07/31/2030    HIV Screening  Completed    Pneumococcal Vaccine: Pediatrics (0 to 5 Years) and At-Risk Patients (6 to 59 Years)  Aged Out    HIB Vaccine  Aged Out    Hepatitis B Vaccine  Aged Out    IPV Vaccine  Aged Out    Hepatitis A Vaccine  Aged Out    Meningococcal ACWY Vaccine  Aged [employer] History   Administered Date(s) Administered    Tdap 07/31/2020         Sheridan Izaguirre MD   [address]  3/30/2021  2:05 PM    Parts of this note were dictated using [email or web address] dictation software and may have sounds-like errors due to variation in pronunciation

## 2021-03-30 NOTE — ASSESSMENT & PLAN NOTE
Improvement in sxs with lexapro 5mg, still has 1-2 days per week with symptoms of depression  PHQ-9 score of 4  Coexisting anxiety, not much improved  No thoughts of self harm/harming others  Enjoying her baby who is now 6 months    - No appt yet for behavioral therapy  Plan:   - increase lexapro to 10mg qd  - Referral to behavioral therapy  Asked staff to call and schedule appt at [facility]  - f/u in 3 weeks, reassess depression and anxiety  - ED precautions advised  Call clinic or go to ED if having thoughts of harming self or others

## 2021-03-31 ENCOUNTER — TELEPHONE (OUTPATIENT)
Dept: FAMILY MEDICINE CLINIC | Facility: CLINIC | Age: 21
End: 2021-03-31

## 2021-03-31 ENCOUNTER — DOCUMENTATION (OUTPATIENT)
Dept: OTHER | Facility: HOSPITAL | Age: 21
End: 2021-03-31

## 2021-03-31 DIAGNOSIS — F32.1 CURRENT MODERATE EPISODE OF MAJOR DEPRESSIVE DISORDER, UNSPECIFIED WHETHER RECURRENT (HCC): Primary | ICD-10-CM

## 2021-03-31 RX ORDER — ESCITALOPRAM OXALATE 10 MG/1
10 TABLET ORAL DAILY
Qty: 30 TABLET | Refills: 0 | Status: SHIPPED | OUTPATIENT
Start: 2021-03-31 | End: 2021-04-30 | Stop reason: SDUPTHER

## 2021-03-31 NOTE — TELEPHONE ENCOUNTER
Patient's prescription for Lexapro needs to be changed to 10 mg 1 tablet a day  Pharmacy will not fill  Please advise  Thank you

## 2021-04-05 ENCOUNTER — TELEPHONE (OUTPATIENT)
Dept: OBGYN CLINIC | Facility: CLINIC | Age: 21
End: 2021-04-05

## 2021-04-05 NOTE — TELEPHONE ENCOUNTER
Called patient to confirm she has an appointment with therapy  She has an appointment scheduled for tomorrow  She was appreciative of my call  All questions answered

## 2021-04-13 ENCOUNTER — TELEPHONE (OUTPATIENT)
Dept: FAMILY MEDICINE CLINIC | Facility: CLINIC | Age: 21
End: 2021-04-13

## 2021-04-20 ENCOUNTER — OFFICE VISIT (OUTPATIENT)
Dept: FAMILY MEDICINE CLINIC | Facility: CLINIC | Age: 21
End: 2021-04-20
Payer: COMMERCIAL

## 2021-04-20 VITALS
TEMPERATURE: 97.7 F | HEART RATE: 86 BPM | DIASTOLIC BLOOD PRESSURE: 72 MMHG | SYSTOLIC BLOOD PRESSURE: 112 MMHG | OXYGEN SATURATION: 99 %

## 2021-04-20 DIAGNOSIS — F33.1 MODERATE EPISODE OF RECURRENT MAJOR DEPRESSIVE DISORDER (HCC): Primary | ICD-10-CM

## 2021-04-20 DIAGNOSIS — F41.9 ANXIETY: ICD-10-CM

## 2021-04-20 PROBLEM — F41.1 GENERALIZED ANXIETY DISORDER: Status: ACTIVE | Noted: 2021-04-20

## 2021-04-20 PROCEDURE — 99213 OFFICE O/P EST LOW 20 MIN: CPT | Performed by: FAMILY MEDICINE

## 2021-04-20 NOTE — PROGRESS NOTES
Family Medicine Office Visit  Raudel Guerra 23 y o  female   MRN: [id number] : 2000  ENCOUNTER: 2021 4:33 PM    Assessment & Plan     This is a 23 y o  female who is here for f/u of depression and anxiety  Sx's significantly improved  Anxiety  Anxiety now improved with increased dose of lexapro  - continue current rx regimen  Moderate episode of recurrent major depressive disorder (HCC)  Well controlled on current lexapro dose  No side effects reports  - continue lexapro at current dose  - Call office or go to ED if having thoughts of harming self or others  Pt understands and is agreeable  F/u in 3 months or sooner prn      Subjective     Chief Complaint   Patient presents with    Follow-up     medication change, lexapro       HPI  Raudel Guerra is a 23y o -year-old who presents for f/u of depression and anxiety  Reports sxs have improved significantly after increasing dose of lexapro  No thoughts of harming self or others  Prior therapist office did not take her insurance but was provided with list of other clinics that may  Pt will call these clinics tomorrow to schedule visit with therapist      No other complaints at this time    Review of Systems   Constitutional: Negative for activity change and appetite change  Respiratory: Negative for chest tightness and shortness of breath  Cardiovascular: Negative for chest pain and palpitations  Psychiatric/Behavioral: Negative for decreased concentration, dysphoric mood, self-injury and sleep disturbance  The patient is not nervous/anxious  Past Medical History, Past Surgical History, Family History, and Social History were reviewed and updated today as appropriate  Objective   /72 (BP Location: Left arm, Patient Position: Sitting, Cuff Size: Large)   Pulse 86   Temp 97 7 °F (36 5 °C)   SpO2 99%     Physical Exam  Constitutional:       General: She is not in acute distress  Appearance: Normal appearance  She is not ill-appearing, toxic-appearing or diaphoretic  Cardiovascular:      Rate and Rhythm: Normal rate and regular rhythm  Pulses: Normal pulses  Heart sounds: Normal heart sounds  Pulmonary:      Effort: Pulmonary effort is normal       Breath sounds: Normal breath sounds  Neurological:      Mental Status: She is alert     Psychiatric:      Comments: Happy, calm, pleasant affect          No Known Allergies    Health Maintenance     Health Maintenance   Topic Date Due    HPV Vaccine (1 - 2-dose series) Never done    COVID-19 Vaccine (1) Never done    BMI: Followup Plan  Never done    Influenza Vaccine (1) Never done    Chlamydia Screening  03/05/2021    Annual Physical  03/12/2022    BMI: Adult  03/30/2022    Depression Remission PHQ  03/30/2022    Cervical Cancer Screening  03/05/2023    DTaP,Tdap,and Td Vaccines (2 - Td) 07/31/2030    HIV Screening  Completed    Pneumococcal Vaccine: Pediatrics (0 to 5 Years) and At-Risk Patients (6 to 59 Years)  Aged Out    HIB Vaccine  Aged Out    Hepatitis B Vaccine  Aged Out    IPV Vaccine  Aged Out    Hepatitis A Vaccine  Aged Out    Meningococcal ACWY Vaccine  Aged [employer] History   Administered Date(s) Administered    Tdap 07/31/2020         Yi Blanca MD   [address]  4/20/2021  4:33 PM    Parts of this note were dictated using [employer] dictation software

## 2021-04-20 NOTE — ASSESSMENT & PLAN NOTE
Well controlled on current lexapro dose  No side effects reports  - continue lexapro at current dose

## 2021-04-30 DIAGNOSIS — F32.1 CURRENT MODERATE EPISODE OF MAJOR DEPRESSIVE DISORDER, UNSPECIFIED WHETHER RECURRENT (HCC): ICD-10-CM

## 2021-04-30 RX ORDER — ESCITALOPRAM OXALATE 10 MG/1
10 TABLET ORAL DAILY
Qty: 30 TABLET | Refills: 0 | Status: SHIPPED | OUTPATIENT
Start: 2021-04-30 | End: 2021-06-01 | Stop reason: SDUPTHER

## 2021-04-30 NOTE — TELEPHONE ENCOUNTER
Patient requesting refill on the attached medication  Patient stated she is almost out and was wondering if she can get a 90 day supply so she does not keep forgetting to call  Please advise  Thank you

## 2021-06-01 DIAGNOSIS — F32.1 CURRENT MODERATE EPISODE OF MAJOR DEPRESSIVE DISORDER, UNSPECIFIED WHETHER RECURRENT (HCC): ICD-10-CM

## 2021-06-01 RX ORDER — ESCITALOPRAM OXALATE 10 MG/1
10 TABLET ORAL DAILY
Qty: 30 TABLET | Refills: 0 | Status: SHIPPED | OUTPATIENT
Start: 2021-06-01 | End: 2021-06-28

## 2021-06-26 DIAGNOSIS — F32.1 CURRENT MODERATE EPISODE OF MAJOR DEPRESSIVE DISORDER, UNSPECIFIED WHETHER RECURRENT (HCC): ICD-10-CM

## 2021-06-28 RX ORDER — ESCITALOPRAM OXALATE 10 MG/1
TABLET ORAL
Qty: 30 TABLET | Refills: 0 | Status: SHIPPED | OUTPATIENT
Start: 2021-06-28 | End: 2021-07-23 | Stop reason: SDUPTHER

## 2021-07-23 ENCOUNTER — OFFICE VISIT (OUTPATIENT)
Dept: FAMILY MEDICINE CLINIC | Facility: CLINIC | Age: 21
End: 2021-07-23
Payer: COMMERCIAL

## 2021-07-23 VITALS
HEART RATE: 104 BPM | BODY MASS INDEX: 39.06 KG/M2 | SYSTOLIC BLOOD PRESSURE: 110 MMHG | WEIGHT: 242 LBS | DIASTOLIC BLOOD PRESSURE: 74 MMHG | OXYGEN SATURATION: 98 % | RESPIRATION RATE: 20 BRPM | TEMPERATURE: 97 F

## 2021-07-23 DIAGNOSIS — F32.1 CURRENT MODERATE EPISODE OF MAJOR DEPRESSIVE DISORDER, UNSPECIFIED WHETHER RECURRENT (HCC): ICD-10-CM

## 2021-07-23 PROBLEM — R12 HEART BURN: Status: RESOLVED | Noted: 2020-09-03 | Resolved: 2021-07-23

## 2021-07-23 PROBLEM — O99.210 OBESITY IN PREGNANCY, ANTEPARTUM: Status: RESOLVED | Noted: 2020-03-17 | Resolved: 2021-07-23

## 2021-07-23 PROCEDURE — 99213 OFFICE O/P EST LOW 20 MIN: CPT | Performed by: FAMILY MEDICINE

## 2021-07-23 RX ORDER — ESCITALOPRAM OXALATE 10 MG/1
10 TABLET ORAL DAILY
Qty: 90 EACH | Refills: 3 | Status: SHIPPED | OUTPATIENT
Start: 2021-07-23 | End: 2022-01-28

## 2021-07-23 RX ORDER — ESCITALOPRAM OXALATE 5 MG/1
5 TABLET ORAL DAILY
Qty: 90 TABLET | Refills: 3 | Status: SHIPPED | OUTPATIENT
Start: 2021-07-23 | End: 2022-01-28

## 2021-07-23 NOTE — ASSESSMENT & PLAN NOTE
This 24 yo F now having breakthrough depression and anxiety sxs  No thoughts of harming self or others  No known new psychosocial stressors  - increase lexapro dose to 15 mg qd from 10 mg qd  - f/u in 4 weeks  - call office or go to ED if having thoughts of harming self or others  ED precautions emphasized  Patient verbalizes understanding and agrees with the plan

## 2021-07-23 NOTE — PATIENT INSTRUCTIONS
Depression   WHAT YOU NEED TO KNOW:   Depression is a medical condition that causes feelings of sadness or hopelessness that do not go away  Depression may cause you to lose interest in things you used to enjoy  These feelings may interfere with your daily life  DISCHARGE INSTRUCTIONS:   Call your local emergency number (911 in the [address]) if:   · You think about harming yourself or someone else  · You have done something on purpose to hurt yourself  Call your therapist or doctor if:   · Your symptoms do not improve  · You cannot make it to your next appointment  · You have new symptoms  · You have questions or concerns about your condition or care  The following resources are available at any time to help you, if needed:   · [facility]: 1-218.997.8608 (5-439-760-SDLI)     · Suicide Hotline: 6-494.147.3421 (2-341-NVSIXIB)     · For a list of international numbers: https://save org/find-help/international-resources/    Medicines:   · Antidepressants  may be given to improve or balance your mood  You may need to take this medicine for several weeks before you begin to feel better  · Take your medicine as directed  Contact your healthcare provider if you think your medicine is not helping or if you have side effects  Tell him of her if you are allergic to any medicine  Keep a list of the medicines, vitamins, and herbs you take  Include the amounts, and when and why you take them  Bring the list or the pill bottles to follow-up visits  Carry your medicine list with you in case of an emergency  Therapy  is often used together with medicine to relieve depression  Therapy is a way for you to talk about your feelings and anything that may be causing depression  Therapy can be done alone or in a group  It may also be done with family members or a significant other  Self-care:   · Get regular physical activity  Try to be active for 30 minutes, 3 to 5 days a week   Physical activity can help relieve depression  Work with your healthcare provider to develop a plan that you enjoy  It may help to ask someone to be active with you  · Create a regular sleep schedule  A routine can help you relax before bed  Listen to music, read, or do yoga  Try to go to bed and wake up at the same time every day  Sleep is important for emotional health  · Eat a variety of healthy foods  Healthy foods include fruits, vegetables, whole-grain breads, low-fat dairy products, lean meats, fish, and cooked beans  A healthy meal plan is low in fat, salt, and added sugar  · Do not drink alcohol or use drugs  Alcohol and drugs can make depression worse  Talk to your therapist or doctor if you need help quitting  Follow up with your healthcare provider as directed: Your healthcare provider will monitor your progress at follow-up visits  He or she will also monitor your medicine if you take antidepressants  Your healthcare provider will ask if the medicine is helping  Tell him or her about any side effects or problems you may have with your medicine  The type or amount of medicine may need to be changed  Write down your questions so you remember to ask them during your visits  © Copyright [employer] 2021 Information is for End User's use only and may not be sold, redistributed or otherwise used for commercial purposes  All illustrations and images included in CareNotes® are the copyrighted property of A D A M [employer] Inc  or [employer]  The above information is an  only  It is not intended as medical advice for individual conditions or treatments  Talk to your doctor, nurse or pharmacist before following any medical regimen to see if it is safe and effective for you  Anxiety   WHAT YOU NEED TO KNOW:   Anxiety is a condition that causes you to feel extremely worried or nervous  The feelings are so strong that they can cause problems with your daily activities or sleep   Anxiety may be triggered by something you fear, or it may happen without a cause  Family or work stress, smoking, caffeine, and alcohol can increase your risk for anxiety  Certain medicines or health conditions can also increase your risk  Anxiety can become a long-term condition if it is not managed or treated  DISCHARGE INSTRUCTIONS:   Call your local emergency number (911 in the [address]) if:   · You have chest pain, tightness, or heaviness that may spread to your shoulders, arms, jaw, neck, or back  · You feel like hurting yourself or someone else  Call your doctor if:   · Your symptoms get worse or do not get better with treatment  · Your anxiety keeps you from doing your regular daily activities  · You have new symptoms since your last visit  · You have questions or concerns about your condition or care  Medicines:   · Medicines  may be given to help you feel more calm and relaxed, and decrease your symptoms  · Take your medicine as directed  Contact your healthcare provider if you think your medicine is not helping or if you have side effects  Tell him of her if you are allergic to any medicine  Keep a list of the medicines, vitamins, and herbs you take  Include the amounts, and when and why you take them  Bring the list or the pill bottles to follow-up visits  Carry your medicine list with you in case of an emergency  Manage anxiety:   · Talk to someone about your anxiety  Your healthcare provider may suggest counseling  Cognitive behavioral therapy can help you understand and change how you react to events that trigger your symptoms  You might feel more comfortable talking with a friend or family member about your anxiety  Choose someone you know will be supportive and encouraging  · Find ways to relax  Activities such as exercise, meditation, or listening to music can help you relax  Spend time with friends, or do things you enjoy  · Practice deep breathing    Deep breathing can help you relax when you feel anxious  Focus on taking slow, deep breaths several times a day, or during an anxiety attack  Breathe in through your nose and out through your mouth  · Create a regular sleep routine  Regular sleep can help you feel calmer during the day  Go to sleep and wake up at the same times every day  Do not watch television or use the computer right before bed  Your room should be comfortable, dark, and quiet  · Eat a variety of healthy foods  Healthy foods include fruits, vegetables, low-fat dairy products, lean meats, fish, whole-grain breads, and cooked beans  Healthy foods can help you feel less anxious and have more energy  · Exercise regularly  Exercise can increase your energy level  Exercise may also lift your mood and help you sleep better  Your healthcare provider can help you create an exercise plan  · Do not smoke  Nicotine and other chemicals in cigarettes and cigars can increase anxiety  Ask your healthcare provider for information if you currently smoke and need help to quit  E-cigarettes or smokeless tobacco still contain nicotine  Talk to your healthcare provider before you use these products  · Do not have caffeine  Caffeine can make your symptoms worse  Do not have foods or drinks that are meant to increase your energy level  · Limit or do not drink alcohol  Ask your healthcare provider if alcohol is safe for you  You may not be able to drink alcohol if you take certain anxiety or depression medicines  Limit alcohol to 1 drink per day if you are a woman  Limit alcohol to 2 drinks per day if you are a man  A drink of alcohol is 12 ounces of beer, 5 ounces of wine, or 1½ ounces of liquor  · Do not use drugs  Drugs can make your anxiety worse  It can also make anxiety hard to manage  Talk to your healthcare provider if you use drugs and want help to quit      Follow up with your doctor within 2 weeks or as directed:  Write down your questions so you remember to ask them during your visits  © Copyright [employer] 2021 Information is for End User's use only and may not be sold, redistributed or otherwise used for commercial purposes  All illustrations and images included in CareNotes® are the copyrighted property of [employer] Inc  or [employer]  The above information is an  only  It is not intended as medical advice for individual conditions or treatments  Talk to your doctor, nurse or pharmacist before following any medical regimen to see if it is safe and effective for you

## 2021-07-23 NOTE — PROGRESS NOTES
Family Medicine Office Visit  Rafi Enamorado 23 y o  female   MRN: [id number] : 2000  ENCOUNTER: 2021 1:27 PM    Assessment & Plan   Assessment: This is a 23 y o  female who is here for Follow-up (new med dose would like increase)       Problem List Items Addressed This Visit        Other    Current moderate episode of major depressive disorder ([address] )     This 24 yo F now having breakthrough depression and anxiety sxs  No thoughts of harming self or others  No known new psychosocial stressors  - increase lexapro dose to 15 mg qd from 10 mg qd  - f/u in 4 weeks  - call office or go to ED if having thoughts of harming self or others  ED precautions emphasized  Patient verbalizes understanding and agrees with the plan  Relevant Medications    escitalopram (LEXAPRO) 5 mg tablet    escitalopram (LEXAPRO) 10 mg tablet                       Return in about 4 weeks (around 2021) for Recheck  Subjective   CC: Follow-up (new med dose would like increase)      HPI  Rafi Enamorado is a 23y o -year-old female who  has a past medical history of Anemia and Varicella        Today she presents for depression and anxiety f/u  - Initially symptoms well controlled with Lexapro 10 mg daily but she is now having breakthrough symptoms for the past month  No known recent psychosocial stressors preceding on therapy to symptoms  No thoughts of harming self or others  -   Has good social support system initially given she is now in a relationship   -  Her infant is doing well and patient states she is enjoying her baby  No  Thoughts of harming baby  -   Currently does not have time to exercise because of her work at moderately decreased  -   Is seeing therapy every Tuesday  She feels this is helping  Review of Systems   Constitutional: Negative for activity change, appetite change and fatigue  Respiratory: Negative for shortness of breath      Cardiovascular: Negative for chest pain and palpitations  Endocrine: Negative for cold intolerance and heat intolerance  Neurological: Negative for light-headedness  Psychiatric/Behavioral: Positive for dysphoric mood  Negative for decreased concentration, self-injury, sleep disturbance and suicidal ideas  The patient is nervous/anxious  The patient is not hyperactive  Past Medical History, Past Surgical History, Family History, and Social History were reviewed and updated today as appropriate  Objective   /74 (BP Location: Left arm, Patient Position: Sitting, Cuff Size: Large)   Pulse 104   Temp (!) 97 °F (36 1 °C)   Resp 20   Wt 110 kg (242 lb)   SpO2 98%   BMI 39 06 kg/m²     Physical Exam  Constitutional:       Appearance: Normal appearance  HENT:      Head: Atraumatic  Eyes:      General: No scleral icterus  Extraocular Movements: Extraocular movements intact  Pupils: Pupils are equal, round, and reactive to light  Cardiovascular:      Rate and Rhythm: Normal rate and regular rhythm  Pulses: Normal pulses  Heart sounds: Normal heart sounds  Pulmonary:      Effort: Pulmonary effort is normal       Breath sounds: Normal breath sounds  Skin:     General: Skin is warm and dry  Neurological:      Mental Status: She is alert and oriented to person, place, and time  Psychiatric:         Attention and Perception: Attention normal          Mood and Affect: Mood and affect normal          Speech: Speech normal          Behavior: Behavior is cooperative  Thought Content: Thought content does not include homicidal or suicidal ideation  Thought content does not include homicidal or suicidal plan           Reviewed labs below:  Lab Results   Component Value Date    BUN 15 03/12/2021    CREATININE 0 72 03/12/2021    CALCIUM 8 4 03/12/2021    K 4 0 03/12/2021    CO2 24 03/12/2021     03/12/2021     Lab Results   Component Value Date    ALT 53 03/12/2021    AST 23 03/12/2021    ALKPHOS 62 03/12/2021       Lab Results   Component Value Date    WBC 4 64 03/12/2021    HGB 12 2 03/12/2021    HCT 38 1 03/12/2021    MCV 81 (L) 03/12/2021     03/12/2021       No results found for: TSH    No results found for: CHOL  Lab Results   Component Value Date    TRIG 44 03/12/2021     Lab Results   Component Value Date    HDL 41 03/12/2021     Lab Results   Component Value Date    LDLCALC 70 03/12/2021     Lab Results   Component Value Date    HGBA1C 5 6 09/04/2020       Results for orders placed or performed in visit on 03/12/21   TSH, 3rd generation with Free T4 reflex   Result Value Ref Range    TSH 3RD GENERATON 1 082 0 358 - 3 740 uIU/mL   CBC and differential   Result Value Ref Range    WBC 4 64 4 31 - 10 16 Thousand/uL    RBC 4 68 3 81 - 5 12 Million/uL    Hemoglobin 12 2 11 5 - 15 4 g/dL    Hematocrit 38 1 34 8 - 46 1 %    MCV 81 (L) 82 - 98 fL    MCH 26 1 (L) 26 8 - 34 3 pg    MCHC 32 0 31 4 - 37 4 g/dL    RDW 13 5 11 6 - 15 1 %    MPV 10 3 8 9 - 12 7 fL    Platelets 591 153 - 869 Thousands/uL    nRBC 0 /100 WBCs    Neutrophils Relative 41 (L) 43 - 75 %    Immat GRANS % 0 0 - 2 %    Lymphocytes Relative 48 (H) 14 - 44 %    Monocytes Relative 9 4 - 12 %    Eosinophils Relative 2 0 - 6 %    Basophils Relative 0 0 - 1 %    Neutrophils Absolute 1 89 1 85 - 7 62 Thousands/µL    Immature Grans Absolute 0 01 0 00 - 0 20 Thousand/uL    Lymphocytes Absolute 2 21 0 60 - 4 47 Thousands/µL    Monocytes Absolute 0 42 0 17 - 1 22 Thousand/µL    Eosinophils Absolute 0 10 0 00 - 0 61 Thousand/µL    Basophils Absolute 0 01 0 00 - 0 10 Thousands/µL   Comprehensive metabolic panel   Result Value Ref Range    Sodium 142 136 - 145 mmol/L    Potassium 4 0 3 5 - 5 3 mmol/L    Chloride 107 100 - 108 mmol/L    CO2 24 21 - 32 mmol/L    ANION GAP 11 4 - 13 mmol/L    BUN 15 5 - 25 mg/dL    Creatinine 0 72 0 60 - 1 30 mg/dL    Glucose, Fasting 95 65 - 99 mg/dL    Calcium 8 4 8 3 - 10 1 mg/dL    Corrected Calcium 8 9 8 3 - 10 1 mg/dL    AST 23 5 - 45 U/L    ALT 53 12 - 78 U/L    Alkaline Phosphatase 62 46 - 116 U/L    Total Protein 6 9 6 4 - 8 2 g/dL    Albumin 3 4 (L) 3 5 - 5 0 g/dL    Total Bilirubin 0 38 0 20 - 1 00 mg/dL    eGFR 119 ml/min/1 73sq m   Lipid Panel with Direct LDL reflex   Result Value Ref Range    Cholesterol 120 50 - 200 mg/dL    Triglycerides 44 <=150 mg/dL    HDL, Direct 41 >=40 mg/dL    LDL Calculated 70 0 - 100 mg/dL         No Known Allergies    Health Maintenance     Health Maintenance   Topic Date Due    Hepatitis C Screening  Never done    HPV Vaccine (1 - 2-dose series) Never done    COVID-19 Vaccine (1) Never done    BMI: Followup Plan  Never done    Chlamydia Screening  03/05/2021    Influenza Vaccine (1) 09/01/2021    Annual Physical  03/12/2022    BMI: Adult  03/30/2022    Depression Remission PHQ  03/30/2022    Cervical Cancer Screening  03/05/2023    DTaP,Tdap,and Td Vaccines (2 - Td or Tdap) 07/31/2030    HIV Screening  Completed    Pneumococcal Vaccine: Pediatrics (0 to 5 Years) and At-Risk Patients (6 to 59 Years)  Aged Out    HIB Vaccine  Aged Out    Hepatitis B Vaccine  Aged Out    IPV Vaccine  Aged Out    Hepatitis A Vaccine  Aged Out    Meningococcal ACWY Vaccine  Aged [employer] History   Administered Date(s) Administered    Tdap 07/31/2020         Ramakrishna Marcial MD   [address]  7/23/2021  1:27 PM    Parts of this note were dictated using [employer] dictation software

## 2021-09-02 ENCOUNTER — OFFICE VISIT (OUTPATIENT)
Dept: FAMILY MEDICINE CLINIC | Facility: CLINIC | Age: 21
End: 2021-09-02
Payer: COMMERCIAL

## 2021-09-02 VITALS
DIASTOLIC BLOOD PRESSURE: 80 MMHG | HEART RATE: 93 BPM | OXYGEN SATURATION: 99 % | HEIGHT: 66 IN | SYSTOLIC BLOOD PRESSURE: 124 MMHG | WEIGHT: 246.2 LBS | TEMPERATURE: 98.7 F | BODY MASS INDEX: 39.57 KG/M2

## 2021-09-02 DIAGNOSIS — F32.1 CURRENT MODERATE EPISODE OF MAJOR DEPRESSIVE DISORDER, UNSPECIFIED WHETHER RECURRENT (HCC): ICD-10-CM

## 2021-09-02 DIAGNOSIS — F41.9 ANXIETY: Primary | ICD-10-CM

## 2021-09-02 PROCEDURE — 99213 OFFICE O/P EST LOW 20 MIN: CPT | Performed by: FAMILY MEDICINE

## 2021-09-02 RX ORDER — OMEGA-3S/DHA/EPA/FISH OIL/D3 300MG-1000
400 CAPSULE ORAL DAILY
COMMUNITY

## 2021-09-02 NOTE — PROGRESS NOTES
Family Medicine Office Visit  Ayana Ramos 23 y o  female   MRN: [id number] : 2000  ENCOUNTER: 2021 3:07 PM    Assessment & Plan   Assessment: This is a 23 y o  female who is here for Follow-up   of anxiety/depression after lexapro dose increase at last visit  F/u PHQ-9 = 0    1  Anxiety     2  Current moderate episode of major depressive disorder, unspecified whether recurrent ([facility] )          Plan:  1  Anxiety well controlled  a  Continue lexapro 15 mg qhs    2  Depression well controlled  a  Continue lexapro 15 mg qhs   3  Call office or go to nearest ED if having thoughts of harming self or others  Return in about 6 weeks (around 10/14/2021) for f/u of depression (virtual)       Subjective   CC: Follow-up      HPI  Ayana Ramos is a 23y o -year-old female who  has a past medical history of Anemia and Varicella        Today she presents for f/u of depression/anxiety  Reports feeling well  Feels the increased dose of lexapro is working well to control her anxiety/depression  Taking lexapro at night because makes her sleepy which has worked well for her  No thoughts of harming self or others  Has life stressors though she feels she is learning to cope better because of psychotherapy  Follows with psych every Tuesday and is enjoying it  No other complaints at this time    Review of Systems   Constitutional: Negative for fatigue  Neurological: Negative for headaches  Psychiatric/Behavioral: Negative for dysphoric mood, self-injury, sleep disturbance and suicidal ideas  The patient is not nervous/anxious and is not hyperactive  No Known Allergies    Past Medical History, Past Surgical History, Family History, and Social History were reviewed and updated today as appropriate        Objective   /80 (BP Location: Right arm, Patient Position: Sitting, Cuff Size: Large)   Pulse 93   Temp 98 7 °F (37 1 °C)   Ht 5' 6" (1 676 m)   Wt 112 kg (246 lb 3 2 oz)   SpO2 99% BMI 39 74 kg/m²     Physical Exam  Constitutional:       Appearance: Normal appearance  Eyes:      Extraocular Movements: Extraocular movements intact  Pupils: Pupils are equal, round, and reactive to light  Cardiovascular:      Rate and Rhythm: Normal rate and regular rhythm  Pulses: Normal pulses  Heart sounds: Normal heart sounds  Pulmonary:      Effort: Pulmonary effort is normal       Breath sounds: Normal breath sounds  Skin:     General: Skin is warm and dry  Neurological:      Mental Status: She is alert and oriented to person, place, and time  Psychiatric:         Attention and Perception: Attention normal          Mood and Affect: Mood and affect normal          Speech: Speech normal          Behavior: Behavior is cooperative  Thought Content: Thought content does not include homicidal or suicidal ideation  Thought content does not include homicidal or suicidal plan         Labs reviewed:   Lab Results   Component Value Date    BUN 15 03/12/2021    CREATININE 0 72 03/12/2021    CALCIUM 8 4 03/12/2021    K 4 0 03/12/2021    CO2 24 03/12/2021     03/12/2021     Lab Results   Component Value Date    ALT 53 03/12/2021    AST 23 03/12/2021    ALKPHOS 62 03/12/2021       Lab Results   Component Value Date    WBC 4 64 03/12/2021    HGB 12 2 03/12/2021    HCT 38 1 03/12/2021    MCV 81 (L) 03/12/2021     03/12/2021       No results found for: TSH    No results found for: CHOL  Lab Results   Component Value Date    TRIG 44 03/12/2021     Lab Results   Component Value Date    HDL 41 03/12/2021     Lab Results   Component Value Date    LDLCALC 70 03/12/2021     Lab Results   Component Value Date    HGBA1C 5 6 09/04/2020       Results for orders placed or performed in visit on 03/12/21   TSH, 3rd generation with Free T4 reflex   Result Value Ref Range    TSH 3RD GENERATON 1 082 0 358 - 3 740 uIU/mL   CBC and differential   Result Value Ref Range    WBC 4 64 4 31 - 10 16 Thousand/uL    RBC 4 68 3 81 - 5 12 Million/uL    Hemoglobin 12 2 11 5 - 15 4 g/dL    Hematocrit 38 1 34 8 - 46 1 %    MCV 81 (L) 82 - 98 fL    MCH 26 1 (L) 26 8 - 34 3 pg    MCHC 32 0 31 4 - 37 4 g/dL    RDW 13 5 11 6 - 15 1 %    MPV 10 3 8 9 - 12 7 fL    Platelets 026 163 - 590 Thousands/uL    nRBC 0 /100 WBCs    Neutrophils Relative 41 (L) 43 - 75 %    Immat GRANS % 0 0 - 2 %    Lymphocytes Relative 48 (H) 14 - 44 %    Monocytes Relative 9 4 - 12 %    Eosinophils Relative 2 0 - 6 %    Basophils Relative 0 0 - 1 %    Neutrophils Absolute 1 89 1 85 - 7 62 Thousands/µL    Immature Grans Absolute 0 01 0 00 - 0 20 Thousand/uL    Lymphocytes Absolute 2 21 0 60 - 4 47 Thousands/µL    Monocytes Absolute 0 42 0 17 - 1 22 Thousand/µL    Eosinophils Absolute 0 10 0 00 - 0 61 Thousand/µL    Basophils Absolute 0 01 0 00 - 0 10 Thousands/µL   Comprehensive metabolic panel   Result Value Ref Range    Sodium 142 136 - 145 mmol/L    Potassium 4 0 3 5 - 5 3 mmol/L    Chloride 107 100 - 108 mmol/L    CO2 24 21 - 32 mmol/L    ANION GAP 11 4 - 13 mmol/L    BUN 15 5 - 25 mg/dL    Creatinine 0 72 0 60 - 1 30 mg/dL    Glucose, Fasting 95 65 - 99 mg/dL    Calcium 8 4 8 3 - 10 1 mg/dL    Corrected Calcium 8 9 8 3 - 10 1 mg/dL    AST 23 5 - 45 U/L    ALT 53 12 - 78 U/L    Alkaline Phosphatase 62 46 - 116 U/L    Total Protein 6 9 6 4 - 8 2 g/dL    Albumin 3 4 (L) 3 5 - 5 0 g/dL    Total Bilirubin 0 38 0 20 - 1 00 mg/dL    eGFR 119 ml/min/1 73sq m   Lipid Panel with Direct LDL reflex   Result Value Ref Range    Cholesterol 120 50 - 200 mg/dL    Triglycerides 44 <=150 mg/dL    HDL, Direct 41 >=40 mg/dL    LDL Calculated 70 0 - 100 mg/dL       Imaging reviewed:  No orders to display            Health Maintenance     Health Maintenance   Topic Date Due    Hepatitis C Screening  Never done    HPV Vaccine (1 - 2-dose series) Never done    COVID-19 Vaccine (1) Never done    BMI: Followup Plan  Never done    Chlamydia Screening  03/05/2021    Influenza Vaccine (1) 09/01/2021    Annual Physical  03/12/2022    BMI: Adult  07/23/2022    Depression Remission PHQ  09/02/2022    Cervical Cancer Screening  03/05/2023    DTaP,Tdap,and Td Vaccines (2 - Td or Tdap) 07/31/2030    HIV Screening  Completed    Pneumococcal Vaccine: Pediatrics (0 to 5 Years) and At-Risk Patients (6 to 59 Years)  Aged Out    HIB Vaccine  Aged Out    Hepatitis B Vaccine  Aged Out    IPV Vaccine  Aged Out    Hepatitis A Vaccine  Aged Out    Meningococcal ACWY Vaccine  Aged [employer] History   Administered Date(s) Administered    Tdap 07/31/2020         Mily Cook MD   [address]  9/2/2021  3:07 PM    Parts of this note were dictated using [employer] dictation software

## 2021-09-09 ENCOUNTER — VBI (OUTPATIENT)
Dept: ADMINISTRATIVE | Facility: OTHER | Age: 21
End: 2021-09-09

## 2021-10-28 ENCOUNTER — TELEMEDICINE (OUTPATIENT)
Dept: FAMILY MEDICINE CLINIC | Facility: CLINIC | Age: 21
End: 2021-10-28
Payer: COMMERCIAL

## 2021-10-28 DIAGNOSIS — F32.1 CURRENT MODERATE EPISODE OF MAJOR DEPRESSIVE DISORDER, UNSPECIFIED WHETHER RECURRENT (HCC): Primary | ICD-10-CM

## 2021-10-28 PROCEDURE — 99212 OFFICE O/P EST SF 10 MIN: CPT | Performed by: FAMILY MEDICINE

## 2022-01-21 ENCOUNTER — OFFICE VISIT (OUTPATIENT)
Dept: FAMILY MEDICINE CLINIC | Facility: CLINIC | Age: 22
End: 2022-01-21
Payer: COMMERCIAL

## 2022-01-21 VITALS
SYSTOLIC BLOOD PRESSURE: 110 MMHG | BODY MASS INDEX: 38.86 KG/M2 | DIASTOLIC BLOOD PRESSURE: 70 MMHG | OXYGEN SATURATION: 99 % | TEMPERATURE: 98.6 F | HEIGHT: 66 IN | HEART RATE: 77 BPM | WEIGHT: 241.8 LBS

## 2022-01-21 DIAGNOSIS — Z02.1 PHYSICAL EXAM, PRE-EMPLOYMENT: Primary | ICD-10-CM

## 2022-01-21 DIAGNOSIS — Z11.1 PPD SCREENING TEST: ICD-10-CM

## 2022-01-21 PROCEDURE — 99395 PREV VISIT EST AGE 18-39: CPT | Performed by: FAMILY MEDICINE

## 2022-01-21 PROCEDURE — 86580 TB INTRADERMAL TEST: CPT | Performed by: FAMILY MEDICINE

## 2022-01-21 NOTE — PROGRESS NOTES
Family Medicine Office Visit  Scottie Lucia 19 y o  female   MRN: [id number] : 2000  ENCOUNTER: 2022 4:33 PM    Assessment & Plan     1  Physical exam, pre-employment     2  PPD screening test  TB Skin Test      return in 48 hours for ppd read  Patient verbalizes understanding and agrees with the plan  Return for Next scheduled follow up  Subjective   CC: Physical Exam      HPI  Scottie Lucia is a 19y o -year-old female who  has a past medical history of Anemia and Varicella        Today she presents for Physical Exam    Feels well  Review of Systems   Constitutional: Negative for chills and fever  HENT: Negative for rhinorrhea  Respiratory: Negative for cough  Cardiovascular: Negative for chest pain  Gastrointestinal: Negative for constipation, diarrhea, nausea and vomiting  Endocrine: Negative for polydipsia and polyuria  Genitourinary: Negative for difficulty urinating  Musculoskeletal: Negative for arthralgias  Skin: Negative for rash  Neurological: Negative for dizziness and light-headedness  All other systems reviewed and are negative  No Known Allergies    Past Medical History, Past Surgical History, Family History, and Social History were reviewed and updated today as appropriate  Objective   /70   Pulse 77   Temp 98 6 °F (37 °C)   Ht 5' 6" (1 676 m)   Wt 110 kg (241 lb 12 8 oz)   SpO2 99%   BMI 39 03 kg/m²     Physical Exam  Vitals reviewed  Constitutional:       General: She is not in acute distress  Appearance: She is not ill-appearing, toxic-appearing or diaphoretic  HENT:      Head: Normocephalic  Right Ear: Tympanic membrane, ear canal and external ear normal       Left Ear: Tympanic membrane, ear canal and external ear normal    Eyes:      Extraocular Movements: Extraocular movements intact  Pupils: Pupils are equal, round, and reactive to light     Cardiovascular:      Rate and Rhythm: Normal rate and regular rhythm  Pulses: Normal pulses  Heart sounds: Normal heart sounds  Pulmonary:      Effort: Pulmonary effort is normal       Breath sounds: Normal breath sounds  Abdominal:      General: Bowel sounds are normal  There is no distension  Palpations: Abdomen is soft  Tenderness: There is no abdominal tenderness  There is no guarding  Musculoskeletal:      Cervical back: Neck supple  Right lower leg: No edema  Left lower leg: No edema  Lymphadenopathy:      Cervical: No cervical adenopathy  Skin:     General: Skin is warm  Capillary Refill: Capillary refill takes less than 2 seconds  Coloration: Skin is not pale  Findings: No bruising or rash  Neurological:      Mental Status: She is alert and oriented to person, place, and time  Psychiatric:         Mood and Affect: Mood normal             Labs: I have reviewed all lab results           Micah Miller MD   [address]    Parts of this note were dictated using [employer] dictation software

## 2022-01-28 ENCOUNTER — OFFICE VISIT (OUTPATIENT)
Dept: FAMILY MEDICINE CLINIC | Facility: CLINIC | Age: 22
End: 2022-01-28
Payer: COMMERCIAL

## 2022-01-28 VITALS
HEART RATE: 75 BPM | OXYGEN SATURATION: 98 % | WEIGHT: 241 LBS | HEIGHT: 66 IN | SYSTOLIC BLOOD PRESSURE: 112 MMHG | TEMPERATURE: 98.1 F | BODY MASS INDEX: 38.73 KG/M2 | DIASTOLIC BLOOD PRESSURE: 78 MMHG

## 2022-01-28 DIAGNOSIS — F41.1 GAD (GENERALIZED ANXIETY DISORDER): Primary | ICD-10-CM

## 2022-01-28 DIAGNOSIS — F32.1 CURRENT MODERATE EPISODE OF MAJOR DEPRESSIVE DISORDER, UNSPECIFIED WHETHER RECURRENT (HCC): ICD-10-CM

## 2022-01-28 DIAGNOSIS — G25.81 RESTLESS LEGS: ICD-10-CM

## 2022-01-28 DIAGNOSIS — F42.2 MIXED OBSESSIONAL THOUGHTS AND ACTS: ICD-10-CM

## 2022-01-28 LAB
INDURATION: 0 MM
TB SKIN TEST: NEGATIVE

## 2022-01-28 PROCEDURE — 3725F SCREEN DEPRESSION PERFORMED: CPT | Performed by: FAMILY MEDICINE

## 2022-01-28 PROCEDURE — 3008F BODY MASS INDEX DOCD: CPT | Performed by: FAMILY MEDICINE

## 2022-01-28 PROCEDURE — 99213 OFFICE O/P EST LOW 20 MIN: CPT | Performed by: FAMILY MEDICINE

## 2022-01-28 PROCEDURE — 93000 ELECTROCARDIOGRAM COMPLETE: CPT | Performed by: FAMILY MEDICINE

## 2022-01-28 RX ORDER — ESCITALOPRAM OXALATE 20 MG/1
20 TABLET ORAL DAILY
Qty: 30 TABLET | Refills: 2 | Status: SHIPPED | OUTPATIENT
Start: 2022-01-28 | End: 2022-04-27 | Stop reason: SDUPTHER

## 2022-01-28 NOTE — PROGRESS NOTES
Family Medicine Office Visit  Poppy Hurtado 19 y o  female   MRN: [id number] : 2000  ENCOUNTER: 2022 1:12 PM    Assessment & Plan   PHQ-9 score - 11 (moderate depression)  KIM-7 score 17 - severe anxiety  No thoughts of harming self or others  1  KIM (generalized anxiety disorder)  Breakthrough sxs  Increased dose from 15 to 20 mg daily  - escitalopram (LEXAPRO) 20 mg tablet; Take 1 tablet (20 mg total) by mouth daily  Dispense: 30 tablet; Refill: 2    2  Current moderate episode of major depressive disorder, unspecified whether recurrent ([facility] )  - POCT ECG to screen for QTc prolongation as pt has been on lexapro  - escitalopram (LEXAPRO) 20 mg tablet; Take 1 tablet (20 mg total) by mouth daily  Dispense: 30 tablet; Refill: 2    3  Restless legs  Becoming more bothersome, affecting her sleep  - Iron Panel (Includes Ferritin, Iron Sat%, Iron, and TIBC); Future  Will call with results  - start iron supplementation qam with orange juice to help with absorption  4  Mixed obsessional thoughts and acts  Requesting medication  Per pt psychologist states pt would benefit from medication  Discussed that SSRI should help with OCD but since it's not well controlled and may require higher doses/additional medication will refer to Psychiatry for further mgmt  - Referred to [facility]  Staff will assist with scheduling              Physical Activity Assessment and Intervention:    Activity journal reviewed      Emotional and Mental Well-being, Sleep, Connectedness Assessment and Intervention:    Sleep/stress assessment performed    PHQ-9 or GAD7 performed for initial evaluation or follow-up    Mindfulness program recommended/explained    Stress management plan created with patient      Other interventions: Breath work, mindfulness, exercise (even timed squats, pushups, etc given she states she does not have a lot of room in apartment and finds it difficult to leave apartment for gym in winter)  BMI Counseling: Body mass index is 38 9 kg/m²  The BMI is above normal  Nutrition recommendations include encouraging healthy choices of fruits and vegetables, decreasing fast food intake and consuming healthier snacks  Exercise recommendations include moderate physical activity 150 minutes/week  Rationale for BMI follow-up plan is due to patient being overweight or obese  Call office if sxs worsen/fail to improve/have thoughts of harming self or others  ED precautions emphasized  Patient verbalizes understanding and agrees with the plan  Return in about 3 months (around 4/28/2022) for Recheck anxiety, depression, restless legs  Subjective   CC: Follow-up      HPI  Mallory Rivera is a 19y o -year-old female who  has a past medical history of Anemia and Varicella        Today she presents for Follow-up   Having breakthrough anxiety sxs  Feels she needs lexapro dose adjustment  Her psychologist requesting that she be started on OCD medications per pt   No EKG since starting lexapro to evaluate QTc   No thoughts of harming self or others   No new life stressors  Also c/o itching legs at night x3 wks  It would occur on occasion prior to starting medication but has been getting worse of the past few weeks  It is affecting her sleep  Only occurs at night  Mother has restless leg syndrome  Review of Systems   Constitutional: Negative for activity change, appetite change and fatigue  Respiratory: Negative for shortness of breath  Cardiovascular: Negative for chest pain and palpitations  Endocrine: Negative for cold intolerance and heat intolerance  Neurological: Negative for light-headedness  Psychiatric/Behavioral: Negative for decreased concentration, dysphoric mood, self-injury, sleep disturbance and suicidal ideas  The patient is not nervous/anxious and is not hyperactive          No Known Allergies    Past Medical History, Past Surgical History, Family History, and Social History were reviewed and updated today as appropriate  Objective   There were no vitals taken for this visit  Physical Exam  Constitutional:       Appearance: Normal appearance  HENT:      Head: Atraumatic  Eyes:      General: No scleral icterus  Extraocular Movements: Extraocular movements intact  Pupils: Pupils are equal, round, and reactive to light  Cardiovascular:      Rate and Rhythm: Normal rate and regular rhythm  Pulses: Normal pulses  Heart sounds: Normal heart sounds  Pulmonary:      Effort: Pulmonary effort is normal       Breath sounds: Normal breath sounds  Skin:     General: Skin is warm and dry  Neurological:      Mental Status: She is alert and oriented to person, place, and time  Psychiatric:         Attention and Perception: Attention normal          Mood and Affect: Mood and affect normal          Speech: Speech normal          Behavior: Behavior is cooperative  Thought Content: Thought content does not include homicidal or suicidal ideation  Thought content does not include homicidal or suicidal plan  Labs: I have reviewed all lab results           Micah Breen MD   [address]    Parts of this note were dictated using [employer]cue dictation software

## 2022-03-11 ENCOUNTER — VBI (OUTPATIENT)
Dept: ADMINISTRATIVE | Facility: OTHER | Age: 22
End: 2022-03-11

## 2022-04-27 DIAGNOSIS — F41.1 GAD (GENERALIZED ANXIETY DISORDER): ICD-10-CM

## 2022-04-27 DIAGNOSIS — F32.1 CURRENT MODERATE EPISODE OF MAJOR DEPRESSIVE DISORDER, UNSPECIFIED WHETHER RECURRENT (HCC): ICD-10-CM

## 2022-04-27 RX ORDER — ESCITALOPRAM OXALATE 20 MG/1
20 TABLET ORAL DAILY
Qty: 30 TABLET | Refills: 2 | Status: SHIPPED | OUTPATIENT
Start: 2022-04-27 | End: 2022-05-23 | Stop reason: SDUPTHER

## 2022-05-23 ENCOUNTER — OFFICE VISIT (OUTPATIENT)
Dept: FAMILY MEDICINE CLINIC | Facility: CLINIC | Age: 22
End: 2022-05-23
Payer: COMMERCIAL

## 2022-05-23 VITALS
HEIGHT: 66 IN | BODY MASS INDEX: 37.03 KG/M2 | OXYGEN SATURATION: 98 % | DIASTOLIC BLOOD PRESSURE: 82 MMHG | WEIGHT: 230.38 LBS | TEMPERATURE: 98.2 F | HEART RATE: 100 BPM | SYSTOLIC BLOOD PRESSURE: 128 MMHG | RESPIRATION RATE: 22 BRPM

## 2022-05-23 DIAGNOSIS — F32.1 CURRENT MODERATE EPISODE OF MAJOR DEPRESSIVE DISORDER, UNSPECIFIED WHETHER RECURRENT (HCC): ICD-10-CM

## 2022-05-23 DIAGNOSIS — F41.1 GAD (GENERALIZED ANXIETY DISORDER): Primary | ICD-10-CM

## 2022-05-23 PROCEDURE — 99213 OFFICE O/P EST LOW 20 MIN: CPT | Performed by: FAMILY MEDICINE

## 2022-05-23 RX ORDER — ESCITALOPRAM OXALATE 20 MG/1
20 TABLET ORAL EVERY EVENING
Qty: 30 TABLET | Refills: 2 | Status: SHIPPED | OUTPATIENT
Start: 2022-05-23 | End: 2022-07-01 | Stop reason: SDUPTHER

## 2022-05-23 NOTE — PROGRESS NOTES
Family Medicine Office Visit  Kandace Felty 19 y o  female   MRN: [id number] : 2000  ENCOUNTER: 2022 3:54 PM    Assessment & Plan     This is a 19 y o  female here for Follow-up of anxiety, depression, and restless legs  All improved  No SI/HI  1  KIM (generalized anxiety disorder)  escitalopram (LEXAPRO) 20 mg tablet   2  Current moderate episode of major depressive disorder, unspecified whether recurrent (HCC)  escitalopram (LEXAPRO) 20 mg tablet      Continue Lexapro 20 milligrams every evening  Refill ordered  Go to nearest ED if having thoughts of harming self or others  Continue iron supplementation, to be taken with vitamin-C containing food/drink (example orange juice)  Take OTC stool softener such as Colace along with iron to prevent constipation  Call office if sxs worsen/fail to improve  ED precautions emphasized  Patient (family) verbalizes understanding and agrees with the plan  Return in about 3 months (around 2022) for Recheck depression, anxiety       Subjective   CC: Follow-up      HPI  Kandace Felty is a 19y o -year-old female who  has a past medical history of Anemia and Varicella        Today she presents for Follow-up  Anxiety now well controlled with Lexapro dose increased to 20 milligrams every evening  Depression well controlled on current regimen  restless legs significantly improved with iron supplementation  Denies SI/HI    Review of Systems  ROS negative except for as above  No Known Allergies    Past Medical History, Past Surgical History, Family History, and Social History were reviewed and updated today as appropriate        Objective   /82 (BP Location: Left arm, Patient Position: Sitting, Cuff Size: Large)   Pulse 100   Temp 98 2 °F (36 8 °C) (Temporal)   Resp 22   Ht 5' 6" (1 676 m)   Wt 104 kg (230 lb 6 oz)   SpO2 98%   BMI 37 18 kg/m²     Physical Exam  Constitutional:       Appearance: Normal appearance  Eyes:      Extraocular Movements: Extraocular movements intact  Pupils: Pupils are equal, round, and reactive to light  Cardiovascular:      Rate and Rhythm: Normal rate and regular rhythm  Pulses: Normal pulses  Heart sounds: Normal heart sounds  Pulmonary:      Effort: Pulmonary effort is normal       Breath sounds: Normal breath sounds  Neurological:      Mental Status: She is alert  Psychiatric:         Attention and Perception: Attention normal          Mood and Affect: Mood and affect normal          Speech: Speech normal          Behavior: Behavior is cooperative  Thought Content: Thought content does not include homicidal or suicidal ideation  Thought content does not include homicidal or suicidal plan  Labs: I have reviewed all lab results           Micah Hernández MD   [address]    Parts of this note were dictated using [employer] dictation software

## 2022-07-01 DIAGNOSIS — F32.1 CURRENT MODERATE EPISODE OF MAJOR DEPRESSIVE DISORDER, UNSPECIFIED WHETHER RECURRENT (HCC): ICD-10-CM

## 2022-07-01 DIAGNOSIS — F41.1 GAD (GENERALIZED ANXIETY DISORDER): ICD-10-CM

## 2022-07-01 RX ORDER — ESCITALOPRAM OXALATE 20 MG/1
20 TABLET ORAL EVERY EVENING
Qty: 30 TABLET | Refills: 2 | Status: SHIPPED | OUTPATIENT
Start: 2022-07-01 | End: 2022-08-01 | Stop reason: SDUPTHER

## 2022-08-01 DIAGNOSIS — F32.1 CURRENT MODERATE EPISODE OF MAJOR DEPRESSIVE DISORDER, UNSPECIFIED WHETHER RECURRENT (HCC): ICD-10-CM

## 2022-08-01 DIAGNOSIS — F41.1 GAD (GENERALIZED ANXIETY DISORDER): ICD-10-CM

## 2022-08-01 RX ORDER — ESCITALOPRAM OXALATE 20 MG/1
20 TABLET ORAL EVERY EVENING
Qty: 90 TABLET | Refills: 0 | Status: SHIPPED | OUTPATIENT
Start: 2022-08-01 | End: 2022-10-27 | Stop reason: SDUPTHER

## 2022-09-30 NOTE — OB LABOR/OXYTOCIN SAFETY PROGRESS
Labor Progress Note - Mike Lanza 22 y o  female MRN: [id number]    Unit/Bed#: -01 Encounter: [id number]    Dose (ayesha-units/min) Oxytocin: 6 ayesha-units/min  Contraction Frequency (minutes): 2 5-3  Contraction Quality: Moderate  Tachysystole: No   Cervical Dilation: 9        Cervical Effacement: 100  Fetal Station: 1  Baseline Rate: 130 bpm  Fetal Heart Rate: 130 BPM  FHR Category: Category I               Vital Signs:   Vitals:    09/21/20 0600   BP: 108/58   Pulse:    Resp:    Temp:    SpO2:            Notes/comments: Defer check for now  Patient is sleeping and finally comfortable with epidural  FHT category 1  Pit at 6, ctx every 2 5-3  Will reassess in 2 hours or sooner          Yanick Todd MD 9/21/2020 6:08 AM Physical Therapy Evaluation    Referred by: Freedom King [place]; Medical Diagnosis (from order):    Diagnosis Information      Diagnosis    729.5 (ICD-9-CM) - M79.672 (ICD-10-CM) - Intractable left heel pain              Visit: 1    Visit Type: Initial Evaluation  Treatment Diagnosis: left: foot, symptoms with increased pain/symptoms, impaired strength, impaired gait, impaired activity tolerance, impaired balance, impaired mobility, impaired range of motion  Date of onset: this past May  Diagnosis Precautions: History of Cancer (not currently being treated)   Chart reviewed at time of initial evaluation (relevant co-morbidities, allergies, tests and medications listed): Past Medical History:  11/09: Abnormal echocardiogram      Comment:  post mI EJF 58 %  11/9/2016: Abnormal uterine bleeding  11/27/09: Acute myocardial infarction, unspecified site, initial   episode of care      Comment:  MI, cath w/out blockage  No date: ECZEMA      Comment:  recurrent, left forearm, bilateral cheeks  07/18/2017: MM (malignant melanoma of skin) (CMS/HCC)      Comment:  wide excision left inner thigh  07/01/2005: Plantar fascial fibromatosis      Comment:  left  9/1/10: Vitamin D deficiency      Comment:  started supplement  Past Surgical History:  09/19/2007: Cad diagnositc mammography      Comment:  right -  numerous calcifications, probably benign, 6                month f/u recommended  12/14/2017: Colonoscopy diagnostic      Comment:  Dr. James, Hemorrhoids, F/U 10 years  9/2013: Echo heart stress      Comment:  Echocardiac, Stress, WNL  12/09/2016: Hysteroscopy w/ polypectomy      Comment:  Hysteroscopic polypectomy. D&C  : Knee scope,med or lat menis repair      Comment:  R knee torn meniscus  11/09: Left heart cath,percutaneous      Comment:  Cardiac Cath, no blockage  07/21/2017: Malignant skin lesion excision      Comment:  Wide excision, melanoma, left thigh with sentinel node                biopsy -   Wilber  7/2014: Myocardl perf rest stress      Comment:  Myocardial Perfusion, Exercise  09/07/2007: Screening mammography      Comment:  bilateral - clusters of calcifications, right breast  No date: Sleep study attended      Comment:  negative    SUBJECTIVE                                                                                                               Patient reports she has left heel pain. She states she walks about 5 miles a day. She hasn't walked recently due to the pain in the left heel. She states she will only walk for normal activities. Patient reports standing will bother her at times. She states her pain hurts more in the heel then the arch. Patient reports not having pain in her calf or achilles. Pain in the morning after sleeping.   Pain / Symptoms:  Pain rating (out of 10): Current: 0 ; Best: 0; Worst: 8  Location: Left heel  Quality / Description: sharp.  Alleviating Factors: over-the-counter medication.   Progression since onset: worsening  Function:   Limitations / Exacerbation Factors: pain, difficulty, increased time  Prior Level of Function: declining function, therefore referred to therapy,  Patient Goals: decreased pain    Prior treatment: no therapies  Discharged from hospital, home health, or skilled nursing facility in last 30 days: no    Home Environment:   Patient lives with significant other  Assistance available: intermittent  Feels safe at home/work/school.  2 or more falls or an unexplained fall with injury in the last year:  No    OBJECTIVE                                                                                                                     Observation:   Ankle/Foot:   • Left: pes planus;   Observed Gait:    Analysis:;   • Left: decreased stance time    Range of Motion (ROM)   (degrees unless noted; active unless noted; norms in ( ); negative=lacking to 0, positive=beyond 0)    Ankle:    - Dorsiflexion (20):        • Left: 5        • Right: 15     -  Plantar Flexion (40-50):        • Left: 60        • Right: 65    - Inversion (30-35):        • Left: 25        • Right: 25    - Eversion (15-20):        • Left: 15        • Right: 15    Strength  (out of 5 unless noted, standard test position unless noted, lbs tested with hand held dynamometer)   Ankle:    - Dorsiflexion:        • Left: 3+        • Right: 5    - Plantar Flexion:        • Left: 4        • Right: 5    - Inversion:        • Left: 5         • Right: 5    - Eversion:        • Left: 4         • Right: 5    Dermatome Testing:  L1 (back, over trochanter and groin):     - Light Touch: • Left: intact • Right: intact  L2 (back, front of thigh to knee):     - Light Touch: • Left: intact • Right: intact  L3 (back, upper buttock, anterior thigh, knee, medial lower leg):     - Light Touch: • Left: intact • Right: intact  L4 (medial buttock, lateral thigh, medial leg, dorsum of foot, great toe):     - Light Touch: • Left: intact • Right: intact  L5 (buttock, posterior and lateral thigh, lateral aspect of leg, dorsum of foot, medial half of sole, 1-3rd toes):     - Light Touch: • Left: intact • Right: intact   S1 (buttock, thigh, posterior leg):     - Light Touch: • Left: intact • Right: intact    Palpation:   Left Lower Extremity: med tubrical calcaneus tenderness;        Outcome Measures:   Lower Extremity Functional Scale: LEFS Calculated Total: 53 (0=extreme difficulty; 80=no difficulty) see flowsheet for additional documentation    TREATMENT                                                                                                                initial evaluation completed    Therapeutic Exercise:  Patient was instructed on continuing current HEP, along with addition for seated left toe curls and seated left eversion with level #2 non latex resistance band.     Manual Therapy:  Soft tissue mobilization to left plantar fascia- 8 minutes    Skilled input: verbal instruction/cues    Writer verbally educated  and received verbal consent for hand placement, positioning of patient, and techniques to be performed today from patient for therapist position for techniques as described above and how they are pertinent to the patient's plan of care.    Home Exercise Program/Education Materials: Patient was instructed on continuing current HEP, along with addition for seated left toe curls and seated left eversion with level #2 non latex resistance band.      ASSESSMENT                                                                                                           57 year old female patient has reported functional limitations listed above impacted by signs and symptoms consistent with below   • Involved:       - left foot   • Symptoms/impairments: increased pain/symptoms, impaired strength, impaired gait, impaired activity tolerance, impaired balance, impaired mobility and impaired range of motion  Prognosis: patient will benefit from skilled therapy  Rehabilitative potential is: good     • Predicted patient presentation: Low (stable) - Patient comorbidities and complexities, as defined above, will have little effect on progress for prescribed plan of care.  Patient Education:   Who will be receiving education: patient  Are they ready to learn: yes  Preferred learning style: written, verbal and demonstration  Barriers to learning: no barriers apparent at this time  Results of above outlined education: Verbalizes understanding and Demonstrates understanding      PLAN                                                                                                                         The following skilled interventions to be implemented to achieve goals listed below:Activities of Daily Living/Self Care (18034)  Gait Training (24858)  Manual Therapy (09866)  Neuromuscular Re-Education (36274)  Therapeutic Activity (13973)  Therapeutic Exercise (51008)  Electrical Stimulation (unattended, 03326 or )  Heat/Cold  (25631)  Ultrasound/Phonophoresis (15758)  Frequency / Duration: 2 times per week tapering as patient progresses for an estimated total of 12 visits for 12 weeks    Patient involved in and agreed to plan of care and goals.  Patient given attendance policy at time of initial evaluation.  Suggestions for next session as indicated: Progress per plan of care      GOALS                                                                                                                           Long Term Goals: to be met by end of plan of care  1. Patient will report ability to perform standing task for greater then 30 minutes without increase in left plantar fascial irritation.   2. Patient will report ability to ambulate greater then 2 city blocks without increase in irritation.   3. Patient will report pain at worse at left heel at 4/10 or less.       Therapy procedure time and total treatment time can be found documented on the Time Entry flowsheet

## 2022-10-27 DIAGNOSIS — F32.1 CURRENT MODERATE EPISODE OF MAJOR DEPRESSIVE DISORDER, UNSPECIFIED WHETHER RECURRENT (HCC): ICD-10-CM

## 2022-10-27 DIAGNOSIS — F41.1 GAD (GENERALIZED ANXIETY DISORDER): ICD-10-CM

## 2022-10-27 RX ORDER — ESCITALOPRAM OXALATE 20 MG/1
20 TABLET ORAL EVERY EVENING
Qty: 90 TABLET | Refills: 0 | Status: SHIPPED | OUTPATIENT
Start: 2022-10-27 | End: 2023-01-25

## 2022-11-16 ENCOUNTER — VBI (OUTPATIENT)
Dept: ADMINISTRATIVE | Facility: OTHER | Age: 22
End: 2022-11-16

## 2022-11-16 NOTE — TELEPHONE ENCOUNTER
11/16/22 8:56 AM     [employer] SmartForm used to document caregap status      Daniella Schumacher MA

## 2022-12-05 ENCOUNTER — OFFICE VISIT (OUTPATIENT)
Dept: FAMILY MEDICINE CLINIC | Facility: CLINIC | Age: 22
End: 2022-12-05

## 2022-12-05 VITALS
DIASTOLIC BLOOD PRESSURE: 70 MMHG | HEART RATE: 72 BPM | HEIGHT: 66 IN | BODY MASS INDEX: 35.03 KG/M2 | SYSTOLIC BLOOD PRESSURE: 110 MMHG | WEIGHT: 218 LBS | OXYGEN SATURATION: 98 %

## 2022-12-05 DIAGNOSIS — Z13.9 SCREENING DUE: ICD-10-CM

## 2022-12-05 DIAGNOSIS — F41.9 ANXIETY: Primary | ICD-10-CM

## 2022-12-05 DIAGNOSIS — F33.1 MODERATE EPISODE OF RECURRENT MAJOR DEPRESSIVE DISORDER (HCC): ICD-10-CM

## 2022-12-05 NOTE — PROGRESS NOTES
"Name: Mirian Hidalgo      : 2000      MRN: [id number]  Encounter Provider: Freddie England DO  Encounter Date: 2022   Encounter department: [facility]    Assessment & Plan     1  Anxiety  - Symptoms well-controlled   - Continue Lexapro 20 mg PO daily     2  Moderate episode of recurrent major depressive disorder (HCC)  - Denied any suicidal/homicidal ideation/intention/plan  - Reports mood has been stable at current medication dose  - Continue Lexapro 20 mg PO daily     3  Screening due  -     Lipid Panel with Direct LDL reflex; Future  -     Comprehensive metabolic panel; Future  -     CBC and differential; Future           Subjective      Presents for follow up, lexapro, no changes to doses, no side effects  Denies any suicide ideation/intention homicidal ideation/intention  Sees therapist, attends appointments, acknowledges importance of medication adherence  Denies any other symptoms  Review of Systems   Constitutional: Negative  HENT: Negative  Eyes: Negative  Respiratory: Negative  Cardiovascular: Negative  Gastrointestinal: Negative  Endocrine: Negative  Genitourinary: Negative  Musculoskeletal: Negative  Neurological: Negative  Psychiatric/Behavioral: Negative for behavioral problems, confusion, decreased concentration, dysphoric mood, hallucinations, self-injury, sleep disturbance and suicidal ideas  The patient is not nervous/anxious  Current Outpatient Medications on File Prior to Visit   Medication Sig   • cholecalciferol (VITAMIN D3) 400 units tablet Take 400 Units by mouth daily   • etonogestrel (NEXPLANON) subdermal implant 68 mg by Subdermal route once       Objective     /70   Pulse 72   Ht 5' 6\" (1 676 m)   Wt 98 9 kg (218 lb)   SpO2 98%   BMI 35 19 kg/m²     Physical Exam  Vitals and nursing note reviewed  Constitutional:       General: She is not in acute distress       Appearance: Normal " appearance  HENT:      Head: Normocephalic and atraumatic  Nose: Nose normal       Mouth/Throat:      Mouth: Mucous membranes are moist       Pharynx: Oropharynx is clear  Eyes:      Extraocular Movements: Extraocular movements intact  Pupils: Pupils are equal, round, and reactive to light  Cardiovascular:      Rate and Rhythm: Normal rate and regular rhythm  Pulses: Normal pulses  Heart sounds: Normal heart sounds  No murmur heard  Pulmonary:      Effort: Pulmonary effort is normal       Breath sounds: Normal breath sounds  Abdominal:      General: Abdomen is flat  Bowel sounds are normal       Palpations: Abdomen is soft  Tenderness: There is no abdominal tenderness  Musculoskeletal:         General: Normal range of motion  Cervical back: Normal range of motion and neck supple  Skin:     General: Skin is warm and dry  Neurological:      General: No focal deficit present  Mental Status: She is alert and oriented to person, place, and time  Psychiatric:         Attention and Perception: Attention and perception normal  She is attentive  She does not perceive auditory or visual hallucinations  Mood and Affect: Mood and affect normal  Mood is not anxious, depressed or elated  Affect is not labile, blunt, flat, angry, tearful or inappropriate  Speech: Speech normal  She is communicative  Speech is not rapid and pressured, delayed, slurred or tangential          Behavior: Behavior normal  Behavior is not agitated, slowed, aggressive, withdrawn, hyperactive or combative  Behavior is cooperative  Thought Content: Thought content normal  Thought content is not paranoid or delusional  Thought content does not include homicidal or suicidal ideation  Thought content does not include homicidal or suicidal plan         Ezequiel Mccullough,

## 2022-12-29 ENCOUNTER — VBI (OUTPATIENT)
Dept: ADMINISTRATIVE | Facility: OTHER | Age: 22
End: 2022-12-29

## 2023-01-19 DIAGNOSIS — F41.1 GAD (GENERALIZED ANXIETY DISORDER): ICD-10-CM

## 2023-01-19 DIAGNOSIS — F32.1 CURRENT MODERATE EPISODE OF MAJOR DEPRESSIVE DISORDER, UNSPECIFIED WHETHER RECURRENT (HCC): ICD-10-CM

## 2023-01-20 RX ORDER — ESCITALOPRAM OXALATE 20 MG/1
20 TABLET ORAL EVERY EVENING
Qty: 90 TABLET | Refills: 0 | Status: SHIPPED | OUTPATIENT
Start: 2023-01-20 | End: 2023-04-20

## 2023-03-17 ENCOUNTER — APPOINTMENT (OUTPATIENT)
Dept: LAB | Facility: CLINIC | Age: 23
End: 2023-03-17

## 2023-03-17 DIAGNOSIS — Z13.9 SCREENING DUE: ICD-10-CM

## 2023-03-17 LAB
ALBUMIN SERPL BCP-MCNC: 4 G/DL (ref 3.5–5)
ALP SERPL-CCNC: 41 U/L (ref 46–116)
ALT SERPL W P-5'-P-CCNC: 30 U/L (ref 12–78)
ANION GAP SERPL CALCULATED.3IONS-SCNC: 5 MMOL/L (ref 4–13)
AST SERPL W P-5'-P-CCNC: 17 U/L (ref 5–45)
BASOPHILS # BLD AUTO: 0.02 THOUSANDS/ÂΜL (ref 0–0.1)
BASOPHILS NFR BLD AUTO: 0 % (ref 0–1)
BILIRUB SERPL-MCNC: 0.56 MG/DL (ref 0.2–1)
BUN SERPL-MCNC: 14 MG/DL (ref 5–25)
CALCIUM SERPL-MCNC: 9.1 MG/DL (ref 8.3–10.1)
CHLORIDE SERPL-SCNC: 109 MMOL/L (ref 96–108)
CHOLEST SERPL-MCNC: 154 MG/DL
CO2 SERPL-SCNC: 23 MMOL/L (ref 21–32)
CREAT SERPL-MCNC: 0.66 MG/DL (ref 0.6–1.3)
EOSINOPHIL # BLD AUTO: 0.06 THOUSAND/ÂΜL (ref 0–0.61)
EOSINOPHIL NFR BLD AUTO: 1 % (ref 0–6)
ERYTHROCYTE [DISTWIDTH] IN BLOOD BY AUTOMATED COUNT: 12.6 % (ref 11.6–15.1)
GFR SERPL CREATININE-BSD FRML MDRD: 124 ML/MIN/1.73SQ M
GLUCOSE P FAST SERPL-MCNC: 90 MG/DL (ref 65–99)
HCT VFR BLD AUTO: 41 % (ref 34.8–46.1)
HDLC SERPL-MCNC: 49 MG/DL
HGB BLD-MCNC: 13.5 G/DL (ref 11.5–15.4)
IMM GRANULOCYTES # BLD AUTO: 0.01 THOUSAND/UL (ref 0–0.2)
IMM GRANULOCYTES NFR BLD AUTO: 0 % (ref 0–2)
LDLC SERPL CALC-MCNC: 95 MG/DL (ref 0–100)
LYMPHOCYTES # BLD AUTO: 1.88 THOUSANDS/ÂΜL (ref 0.6–4.47)
LYMPHOCYTES NFR BLD AUTO: 42 % (ref 14–44)
MCH RBC QN AUTO: 28.7 PG (ref 26.8–34.3)
MCHC RBC AUTO-ENTMCNC: 32.9 G/DL (ref 31.4–37.4)
MCV RBC AUTO: 87 FL (ref 82–98)
MONOCYTES # BLD AUTO: 0.4 THOUSAND/ÂΜL (ref 0.17–1.22)
MONOCYTES NFR BLD AUTO: 9 % (ref 4–12)
NEUTROPHILS # BLD AUTO: 2.13 THOUSANDS/ÂΜL (ref 1.85–7.62)
NEUTS SEG NFR BLD AUTO: 48 % (ref 43–75)
NRBC BLD AUTO-RTO: 0 /100 WBCS
PLATELET # BLD AUTO: 348 THOUSANDS/UL (ref 149–390)
PMV BLD AUTO: 10.3 FL (ref 8.9–12.7)
POTASSIUM SERPL-SCNC: 4.3 MMOL/L (ref 3.5–5.3)
PROT SERPL-MCNC: 7 G/DL (ref 6.4–8.4)
RBC # BLD AUTO: 4.71 MILLION/UL (ref 3.81–5.12)
SODIUM SERPL-SCNC: 137 MMOL/L (ref 135–147)
TRIGL SERPL-MCNC: 48 MG/DL
WBC # BLD AUTO: 4.5 THOUSAND/UL (ref 4.31–10.16)

## 2023-03-28 ENCOUNTER — TELEMEDICINE (OUTPATIENT)
Dept: FAMILY MEDICINE CLINIC | Facility: CLINIC | Age: 23
End: 2023-03-28

## 2023-03-28 DIAGNOSIS — F32.1 CURRENT MODERATE EPISODE OF MAJOR DEPRESSIVE DISORDER, UNSPECIFIED WHETHER RECURRENT (HCC): ICD-10-CM

## 2023-03-28 DIAGNOSIS — F41.1 GAD (GENERALIZED ANXIETY DISORDER): Primary | ICD-10-CM

## 2023-03-28 RX ORDER — ESCITALOPRAM OXALATE 20 MG/1
20 TABLET ORAL EVERY EVENING
Qty: 90 TABLET | Refills: 0 | Status: SHIPPED | OUTPATIENT
Start: 2023-03-28 | End: 2023-06-26

## 2023-03-28 NOTE — PROGRESS NOTES
Virtual Regular Visit    Verification of patient location:    Patient is located in the following state in which I hold an active license [place]      Assessment/Plan:    Problem List Items Addressed This Visit        Other    Current moderate episode of major depressive disorder ([address] )     Symptoms well controlled after dose increase - now on Lexapro 20 mg daily  Denied any breakthrough symptoms nor any suicidal/homicidal ideations/intentions/plans       - Continue Lexapro 20 mg PO daily   - Reassess at next office visit          Relevant Medications    escitalopram (LEXAPRO) 20 mg tablet    KIM (generalized anxiety disorder) - Primary     Symptoms well controlled after dose increase of Lexapro (15 to 20 mg)  Will continue at this dose  Reassess at next office visit  Relevant Medications    escitalopram (LEXAPRO) 20 mg tablet         BMI Counseling: There is no height or weight on file to calculate BMI  The BMI is above normal  Exercise recommendations include moderate physical activity 150 minutes/week and exercising 3-5 times per week  Rationale for BMI follow-up plan is due to patient being overweight or obese  Reason for visit is   Chief Complaint   Patient presents with   • Virtual Regular Visit        Encounter provider July Rodriguez DO    Provider located at [address] [place] [id number] [phone]      Recent Visits  Date Type Provider Dept   03/28/23 [address], [address] recent visits within past 7 days and meeting all other requirements  Future Appointments  No visits were found meeting these conditions  Showing future appointments within next 150 days and meeting all other requirements       The patient was identified by name and date of birth  Christel Arango was informed that this is a telemedicine visit and that the visit is being conducted through the [facility]   Viktoria agrees to proceed     My office door was closed  No one else was in the room  She acknowledged consent and understanding of privacy and security of the video platform  The patient has agreed to participate and understands they can discontinue the visit at any time  Patient is aware this is a billable service  Subjective  Aaliyah Montaño is a 23 y o  female    38-year-old female patient presenting via virtual encounter for follow up on her recent dose increase of Lexapro  Says her anxiety and depression symptoms are well-controlled  No recent breakthrough depressive symptoms nor any anxiety/panic attacks  Denies any suicidal/homicidal ideations/intentions/plans  Denies any other medical conditions today  Past Medical History:   Diagnosis Date   • Anemia    • Varicella     vaccinated       Past Surgical History:   Procedure Laterality Date   • FRACTURE SURGERY Right 2012    Beth Freeze and had surgery with plates and 6 screws       Current Outpatient Medications   Medication Sig Dispense Refill   • escitalopram (LEXAPRO) 20 mg tablet Take 1 tablet (20 mg total) by mouth every evening 90 tablet 0   • cholecalciferol (VITAMIN D3) 400 units tablet Take 400 Units by mouth daily     • etonogestrel (NEXPLANON) subdermal implant 68 mg by Subdermal route once       No current facility-administered medications for this visit  No Known Allergies    Review of Systems   Constitutional: Negative for activity change, appetite change and fatigue  HENT: Negative for congestion  Eyes: Negative for visual disturbance  Respiratory: Negative for choking, chest tightness, shortness of breath and wheezing  Cardiovascular: Negative for chest pain, palpitations and leg swelling  Gastrointestinal: Negative for abdominal pain, constipation, diarrhea, nausea and vomiting  Genitourinary: Negative for dysuria and menstrual problem  Musculoskeletal: Negative for back pain and neck pain     Skin: Negative for rash    Neurological: Negative for headaches  Psychiatric/Behavioral: Negative for behavioral problems, confusion, decreased concentration, dysphoric mood, hallucinations, self-injury, sleep disturbance and suicidal ideas  The patient is not nervous/anxious and is not hyperactive  Video Exam    There were no vitals filed for this visit  Physical Exam  Constitutional:       General: She is not in acute distress  Appearance: Normal appearance  She is not ill-appearing, toxic-appearing or diaphoretic  HENT:      Head: Normocephalic and atraumatic  Right Ear: External ear normal       Left Ear: External ear normal       Nose: Nose normal    Eyes:      Extraocular Movements: Extraocular movements intact  Conjunctiva/sclera: Conjunctivae normal    Neurological:      Mental Status: She is alert  Psychiatric:         Attention and Perception: Attention and perception normal  She is attentive  She does not perceive auditory or visual hallucinations  Mood and Affect: Mood and affect normal  Mood is not anxious, depressed or elated  Affect is not labile, blunt, flat, angry, tearful or inappropriate  Speech: Speech normal  She is communicative  Speech is not rapid and pressured, delayed, slurred or tangential          Behavior: Behavior normal  Behavior is not agitated, slowed, aggressive, withdrawn, hyperactive or combative  Behavior is cooperative  Thought Content: Thought content normal  Thought content is not paranoid or delusional  Thought content does not include homicidal or suicidal ideation  Thought content does not include homicidal or suicidal plan  Cognition and Memory: Cognition normal  Cognition is not impaired           Judgment: Judgment normal           I spent 15 minutes directly with the patient during this visit

## 2023-03-31 PROBLEM — F41.1 GAD (GENERALIZED ANXIETY DISORDER): Status: ACTIVE | Noted: 2023-03-31

## 2023-03-31 NOTE — ASSESSMENT & PLAN NOTE
Symptoms well controlled after dose increase - now on Lexapro 20 mg daily    Denied any breakthrough symptoms nor any suicidal/homicidal ideations/intentions/plans       - Continue Lexapro 20 mg PO daily   - Reassess at next office visit

## 2023-03-31 NOTE — ASSESSMENT & PLAN NOTE
Symptoms well controlled after dose increase of Lexapro (15 to 20 mg)  Will continue at this dose  Reassess at next office visit

## 2023-06-02 PROBLEM — Z13.9 SCREENING DUE: Status: ACTIVE | Noted: 2023-06-02

## 2023-06-29 ENCOUNTER — VBI (OUTPATIENT)
Dept: ADMINISTRATIVE | Facility: OTHER | Age: 23
End: 2023-06-29

## 2023-07-01 ENCOUNTER — APPOINTMENT (OUTPATIENT)
Dept: RADIOLOGY | Facility: CLINIC | Age: 23
End: 2023-07-01
Payer: COMMERCIAL

## 2023-07-01 ENCOUNTER — OFFICE VISIT (OUTPATIENT)
Dept: URGENT CARE | Facility: CLINIC | Age: 23
End: 2023-07-01
Payer: COMMERCIAL

## 2023-07-01 VITALS
HEIGHT: 66 IN | RESPIRATION RATE: 18 BRPM | HEART RATE: 75 BPM | BODY MASS INDEX: 32.37 KG/M2 | DIASTOLIC BLOOD PRESSURE: 84 MMHG | WEIGHT: 201.4 LBS | OXYGEN SATURATION: 100 % | TEMPERATURE: 97.6 F | SYSTOLIC BLOOD PRESSURE: 105 MMHG

## 2023-07-01 DIAGNOSIS — S93.402A SPRAIN OF LEFT ANKLE, UNSPECIFIED LIGAMENT, INITIAL ENCOUNTER: Primary | ICD-10-CM

## 2023-07-01 DIAGNOSIS — S99.912A INJURY OF LEFT ANKLE, INITIAL ENCOUNTER: ICD-10-CM

## 2023-07-01 PROCEDURE — S9088 SERVICES PROVIDED IN URGENT: HCPCS | Performed by: PHYSICIAN ASSISTANT

## 2023-07-01 PROCEDURE — 73610 X-RAY EXAM OF ANKLE: CPT

## 2023-07-01 PROCEDURE — 99213 OFFICE O/P EST LOW 20 MIN: CPT | Performed by: PHYSICIAN ASSISTANT

## 2023-07-01 NOTE — PATIENT INSTRUCTIONS
Joint Sprain    To help with pain   - Take ibuprofen or acetaminophen as directed on the packaging (it helps to start taking it around-the-clock at first to get ahead of the pain, then to take as needed)  - Ice and elevate the injured area 4 times daily for 20-30 minutes each time for the next 3-4 days, then convert to warm compresses in the same regimen   Other measures to take to help with healing    - Perform range of motion exercises 5-10 reps each at least 4 times per day so the joint does not get stiff    - Limit your physical activity with the affected joint   Doing too much too fast is the easiest way to continue to have pain  Follow up with Orthopedics in 10-14 days if symptoms persist

## 2023-07-01 NOTE — PROGRESS NOTES
[facility]        NAME: Milagros Bowles is a 23 y o  female  : 2000    MRN: [id number]  DATE: 2023  TIME: 3:06 PM      Assessment and Plan     Sprain of left ankle, unspecified ligament, initial encounter [S93 402A]  1  Sprain of left ankle, unspecified ligament, initial encounter  XR ankle 3+ vw left        Note:   X-rays look negative for acute osseous abnormality/fracture  Awaiting final read from radiologist       Patient Instructions   Patient Instructions   Joint Sprain    To help with pain   - Take ibuprofen or acetaminophen as directed on the packaging (it helps to start taking it around-the-clock at first to get ahead of the pain, then to take as needed)  - Ice and elevate the injured area 4 times daily for 20-30 minutes each time for the next 3-4 days, then convert to warm compresses in the same regimen   Other measures to take to help with healing    - Perform range of motion exercises 5-10 reps each at least 4 times per day so the joint does not get stiff    - Limit your physical activity with the affected joint  Doing too much too fast is the easiest way to continue to have pain  Follow up with Orthopedics in 10-14 days if symptoms persist          Follow up with PCP in 3-5 days  Go to ER if symptoms worsen  Chief Complaint     Chief Complaint   Patient presents with   • Ankle Injury     Patient reported a week ago she was walking around outside her house on a railroad tie, wasn't paying attention, hit a stick and rolled her ankle  Patient reported that since then she has had increased swelling and painful  Patient reported she's been using ice, ankle brace, elevating her foot and Ibuprofen  Patent reported it has not improved  History of Present Illness     Patient states that 1 week ago she rolled her ankle on a stick, which caused swelling and pain to her left ankle  She states the pain has not gotten better   She has been taking ibuprofen, elevating it, and icing it when possible with some relief  Ankle Injury   Pertinent negatives include no numbness  Review of Systems     Review of Systems   Constitutional: Negative for chills, fatigue and fever  HENT: Negative for congestion, ear pain, postnasal drip, rhinorrhea, sinus pressure, sinus pain and sore throat  Eyes: Negative for pain and visual disturbance  Respiratory: Negative for cough, chest tightness and shortness of breath  Cardiovascular: Negative for chest pain and palpitations  Gastrointestinal: Negative for abdominal pain, diarrhea, nausea and vomiting  Genitourinary: Negative for dysuria and hematuria  Musculoskeletal: Positive for arthralgias, gait problem and joint swelling  Negative for back pain and myalgias  Skin: Negative for rash  Neurological: Negative for dizziness, seizures, syncope, numbness and headaches  All other systems reviewed and are negative          Current Medications       Current Outpatient Medications:   •  etonogestrel (NEXPLANON) subdermal implant, 68 mg by Subdermal route once, Disp: , Rfl:   •  cholecalciferol (VITAMIN D3) 400 units tablet, Take 400 Units by mouth daily (Patient not taking: Reported on 7/1/2023), Disp: , Rfl:   •  escitalopram (LEXAPRO) 20 mg tablet, Take 1 tablet (20 mg total) by mouth every evening, Disp: 90 tablet, Rfl: 0    Current Allergies     Allergies as of 07/01/2023   • (No Known Allergies)              The following portions of the patient's history were reviewed and updated as appropriate: allergies, current medications, past family history, past medical history, past social history, past surgical history, and problem list      Past Medical History:   Diagnosis Date   • Anemia    • Anxiety    • Depression    • PTSD (post-traumatic stress disorder)        Past Surgical History:   Procedure Laterality Date   • FRACTURE SURGERY Right 2012    [facility] and had surgery with plates and 6 screws       Family History   Problem Relation "Age of Onset   • Anxiety disorder Mother    • Lymphoma Brother    • Heart failure Maternal Grandmother    • Multiple sclerosis Maternal Aunt          Medications have been verified  Objective     /84   Pulse 75   Temp 97 6 °F (36 4 °C) (Tympanic)   Resp 18   Ht 5' 6\" (1 676 m)   Wt 91 4 kg (201 lb 6 4 oz)   LMP 06/15/2023 (Approximate)   SpO2 100%   Breastfeeding No   BMI 32 51 kg/m²   Patient's last menstrual period was 06/15/2023 (approximate)  Physical Exam     Physical Exam  Vitals and nursing note reviewed  Constitutional:       Appearance: Normal appearance  She is normal weight  HENT:      Head: Normocephalic  Cardiovascular:      Rate and Rhythm: Normal rate  Pulmonary:      Effort: Pulmonary effort is normal    Musculoskeletal:      Left ankle: Swelling present  No tenderness  Normal range of motion  Comments: Patient reports pain with certain movements such as pivoting on her left ankle  Skin:     General: Skin is warm and dry  Neurological:      General: No focal deficit present  Mental Status: She is alert and oriented to person, place, and time     Psychiatric:         Mood and Affect: Mood normal          Behavior: Behavior normal        "

## 2023-07-26 DIAGNOSIS — F41.1 GAD (GENERALIZED ANXIETY DISORDER): ICD-10-CM

## 2023-07-26 DIAGNOSIS — F32.1 CURRENT MODERATE EPISODE OF MAJOR DEPRESSIVE DISORDER, UNSPECIFIED WHETHER RECURRENT (HCC): ICD-10-CM

## 2023-07-26 RX ORDER — ESCITALOPRAM OXALATE 20 MG/1
20 TABLET ORAL EVERY EVENING
Qty: 90 TABLET | Refills: 0 | Status: CANCELLED | OUTPATIENT
Start: 2023-07-26 | End: 2023-10-24

## 2023-07-27 DIAGNOSIS — F32.1 CURRENT MODERATE EPISODE OF MAJOR DEPRESSIVE DISORDER, UNSPECIFIED WHETHER RECURRENT (HCC): ICD-10-CM

## 2023-07-27 DIAGNOSIS — F41.1 GAD (GENERALIZED ANXIETY DISORDER): ICD-10-CM

## 2023-07-27 RX ORDER — ESCITALOPRAM OXALATE 20 MG/1
20 TABLET ORAL EVERY EVENING
Qty: 90 TABLET | Refills: 0 | Status: SHIPPED | OUTPATIENT
Start: 2023-07-27 | End: 2023-10-25

## 2023-08-01 PROBLEM — Z13.9 SCREENING DUE: Status: RESOLVED | Noted: 2023-06-02 | Resolved: 2023-08-01

## 2023-09-25 ENCOUNTER — TELEPHONE (OUTPATIENT)
Dept: FAMILY MEDICINE CLINIC | Facility: CLINIC | Age: 23
End: 2023-09-25

## 2024-05-21 ENCOUNTER — TELEPHONE (OUTPATIENT)
Dept: FAMILY MEDICINE CLINIC | Facility: CLINIC | Age: 24
End: 2024-05-21

## 2024-05-21 NOTE — TELEPHONE ENCOUNTER
Outgoing Call To: Patient      Doctor: Dionisio      Reason for call: Patient states she is looking for a other doctor office next to her house. Patient states we are 2 hours away from us.       Number: [phone]

## 2024-05-23 ENCOUNTER — VBI (OUTPATIENT)
Dept: ADMINISTRATIVE | Facility: OTHER | Age: 24
End: 2024-05-23

## 2024-05-23 NOTE — TELEPHONE ENCOUNTER
05/23/24 11:35 AM     [employer] SmartForm used to document caregap status.    Kimberly Olivarez MA

## 2024-07-24 ENCOUNTER — TELEPHONE (OUTPATIENT)
Dept: FAMILY MEDICINE CLINIC | Facility: CLINIC | Age: 24
End: 2024-07-24

## 2024-07-24 NOTE — TELEPHONE ENCOUNTER
Please remove Dr Simon from pcp per discussion with patient, she has moved 2 hours away and transferred to another physician

## 2024-07-24 NOTE — TELEPHONE ENCOUNTER
07/24/24 4:29 PM        The office's request has been received, reviewed, and the patient chart updated. The PCP has successfully been removed with a patient attribution note. This message will now be completed.        Thank you  Candida Hung

## 2024-10-14 ENCOUNTER — VBI (OUTPATIENT)
Dept: ADMINISTRATIVE | Facility: OTHER | Age: 24
End: 2024-10-14

## 2024-10-14 NOTE — TELEPHONE ENCOUNTER
10/14/24 10:50 AM     Chart reviewed for Cervical Cancer Screening was/were not submitted to the patient's insurance.     Kimberly Olivarez MA   [facility]

## 2024-12-19 NOTE — DISCHARGE INSTRUCTIONS
Take Tylenol 650mg every 6 hours as needed for pain  Get your repeat blood test drawn in 2 days  Follow-up with OBGYN to have a repeat ultrasound done 
30

## 2025-07-11 NOTE — LETTER
Proof of Pregnancy Letter    Sanket Hewitt  2000  [place] [phone]        02/17/20      Sanket Hewitt is a patient at our facility  Sanket Hewitt Estimated Date of Delivery: 9/23/20       Any questions or concerns, please feel free to contact our office      Sincerely,     [facility]   [address]   [place] [facility]   [phone] Where Is Your Rash Located?: Face and back List Moisturizers You Tried (Separate Each Name With A Comma):: CeraVe Additional Comments (Use Complete Sentences): Patient has had rash on the perioral area since April and the back from 9+ months. The patient and her mom both state that they feel that her condition is less.